# Patient Record
Sex: FEMALE | Race: WHITE | NOT HISPANIC OR LATINO
[De-identification: names, ages, dates, MRNs, and addresses within clinical notes are randomized per-mention and may not be internally consistent; named-entity substitution may affect disease eponyms.]

---

## 2017-02-21 ENCOUNTER — MEDICATION RENEWAL (OUTPATIENT)
Age: 32
End: 2017-02-21

## 2017-02-24 ENCOUNTER — MEDICATION RENEWAL (OUTPATIENT)
Age: 32
End: 2017-02-24

## 2017-03-20 ENCOUNTER — MEDICATION RENEWAL (OUTPATIENT)
Age: 32
End: 2017-03-20

## 2017-04-05 ENCOUNTER — APPOINTMENT (OUTPATIENT)
Dept: NEUROLOGY | Facility: CLINIC | Age: 32
End: 2017-04-05

## 2017-04-05 VITALS
WEIGHT: 149 LBS | BODY MASS INDEX: 23.67 KG/M2 | DIASTOLIC BLOOD PRESSURE: 60 MMHG | HEIGHT: 66.5 IN | SYSTOLIC BLOOD PRESSURE: 100 MMHG | HEART RATE: 72 BPM

## 2017-04-05 DIAGNOSIS — F43.20 ADJUSTMENT DISORDER, UNSPECIFIED: ICD-10-CM

## 2017-07-25 ENCOUNTER — MEDICATION RENEWAL (OUTPATIENT)
Age: 32
End: 2017-07-25

## 2017-07-28 ENCOUNTER — APPOINTMENT (OUTPATIENT)
Dept: NEUROLOGY | Facility: CLINIC | Age: 32
End: 2017-07-28
Payer: MEDICARE

## 2017-07-28 VITALS
HEIGHT: 66.5 IN | SYSTOLIC BLOOD PRESSURE: 110 MMHG | BODY MASS INDEX: 23.67 KG/M2 | WEIGHT: 149 LBS | HEART RATE: 73 BPM | DIASTOLIC BLOOD PRESSURE: 68 MMHG

## 2017-07-28 PROCEDURE — 99214 OFFICE O/P EST MOD 30 MIN: CPT

## 2017-08-16 ENCOUNTER — APPOINTMENT (OUTPATIENT)
Dept: OPHTHALMOLOGY | Facility: CLINIC | Age: 32
End: 2017-08-16
Payer: MEDICARE

## 2017-08-16 DIAGNOSIS — H53.10 UNSPECIFIED SUBJECTIVE VISUAL DISTURBANCES: ICD-10-CM

## 2017-08-16 PROCEDURE — 92083 EXTENDED VISUAL FIELD XM: CPT

## 2017-08-16 PROCEDURE — 99204 OFFICE O/P NEW MOD 45 MIN: CPT

## 2017-08-16 PROCEDURE — 92133 CPTRZD OPH DX IMG PST SGM ON: CPT

## 2017-10-06 ENCOUNTER — APPOINTMENT (OUTPATIENT)
Dept: NEUROLOGY | Facility: CLINIC | Age: 32
End: 2017-10-06
Payer: MEDICARE

## 2017-10-06 VITALS
SYSTOLIC BLOOD PRESSURE: 111 MMHG | HEIGHT: 66.5 IN | HEART RATE: 60 BPM | DIASTOLIC BLOOD PRESSURE: 79 MMHG | BODY MASS INDEX: 23.5 KG/M2 | WEIGHT: 148 LBS

## 2017-10-06 PROCEDURE — 99213 OFFICE O/P EST LOW 20 MIN: CPT

## 2017-12-06 ENCOUNTER — MEDICATION RENEWAL (OUTPATIENT)
Age: 32
End: 2017-12-06

## 2018-04-13 ENCOUNTER — APPOINTMENT (OUTPATIENT)
Dept: NEUROLOGY | Facility: CLINIC | Age: 33
End: 2018-04-13
Payer: COMMERCIAL

## 2018-04-13 VITALS
SYSTOLIC BLOOD PRESSURE: 95 MMHG | WEIGHT: 146 LBS | HEART RATE: 79 BPM | BODY MASS INDEX: 23.19 KG/M2 | DIASTOLIC BLOOD PRESSURE: 55 MMHG | HEIGHT: 66.5 IN

## 2018-04-13 PROCEDURE — 99213 OFFICE O/P EST LOW 20 MIN: CPT

## 2018-09-04 ENCOUNTER — RX RENEWAL (OUTPATIENT)
Age: 33
End: 2018-09-04

## 2018-09-04 ENCOUNTER — MEDICATION RENEWAL (OUTPATIENT)
Age: 33
End: 2018-09-04

## 2018-10-19 ENCOUNTER — APPOINTMENT (OUTPATIENT)
Dept: NEUROLOGY | Facility: CLINIC | Age: 33
End: 2018-10-19
Payer: COMMERCIAL

## 2018-10-19 VITALS
BODY MASS INDEX: 23.67 KG/M2 | HEART RATE: 63 BPM | WEIGHT: 149 LBS | SYSTOLIC BLOOD PRESSURE: 112 MMHG | HEIGHT: 66.5 IN | DIASTOLIC BLOOD PRESSURE: 75 MMHG

## 2018-10-19 PROCEDURE — 99214 OFFICE O/P EST MOD 30 MIN: CPT

## 2018-12-07 ENCOUNTER — MOBILE ON CALL (OUTPATIENT)
Age: 33
End: 2018-12-07

## 2019-03-22 ENCOUNTER — MEDICATION RENEWAL (OUTPATIENT)
Age: 34
End: 2019-03-22

## 2019-03-25 ENCOUNTER — APPOINTMENT (OUTPATIENT)
Dept: NEUROLOGY | Facility: CLINIC | Age: 34
End: 2019-03-25
Payer: COMMERCIAL

## 2019-03-25 VITALS
HEART RATE: 125 BPM | SYSTOLIC BLOOD PRESSURE: 104 MMHG | DIASTOLIC BLOOD PRESSURE: 69 MMHG | BODY MASS INDEX: 25.07 KG/M2 | WEIGHT: 156 LBS | HEIGHT: 66 IN

## 2019-03-25 DIAGNOSIS — R56.9 UNSPECIFIED CONVULSIONS: ICD-10-CM

## 2019-03-25 PROCEDURE — 99214 OFFICE O/P EST MOD 30 MIN: CPT

## 2019-03-25 RX ORDER — TOPIRAMATE 200 MG/1
200 TABLET ORAL
Qty: 180 | Refills: 1 | Status: DISCONTINUED | COMMUNITY
Start: 2018-12-07 | End: 2019-03-25

## 2019-03-25 NOTE — PHYSICAL EXAM
[General Appearance - Alert] : alert [General Appearance - In No Acute Distress] : in no acute distress [General Appearance - Well Nourished] : well nourished [General Appearance - Well Developed] : well developed [General Appearance - Well-Appearing] : healthy appearing [Oriented To Time, Place, And Person] : oriented to person, place, and time [Impaired Insight] : insight and judgment were intact [Affect] : the affect was normal [Person] : oriented to person [Place] : oriented to place [Time] : oriented to time [Short Term Intact] : short term memory intact [Remote Intact] : remote memory intact [Registration Intact] : recent registration memory intact [Concentration Intact] : normal concentrating ability [Visual Intact] : visual attention was ~T not ~L decreased [Naming Objects] : no difficulty naming common objects [Repeating Phrases] : no difficulty repeating a phrase [Writing A Sentence] : no difficulty writing a sentence [Fluency] : fluency intact [Comprehension] : comprehension intact [Reading] : reading intact [Vocabulary] : adequate range of vocabulary [Cranial Nerves Optic (II)] : visual acuity intact bilaterally,  visual fields full to confrontation, pupils equal round and reactive to light [Cranial Nerves Oculomotor (III)] : extraocular motion intact [Cranial Nerves Trigeminal (V)] : facial sensation intact symmetrically [Cranial Nerves Facial (VII)] : face symmetrical [Cranial Nerves Vestibulocochlear (VIII)] : hearing was intact bilaterally [Cranial Nerves Glossopharyngeal (IX)] : tongue and palate midline [Cranial Nerves Accessory (XI - Cranial And Spinal)] : head turning and shoulder shrug symmetric [Cranial Nerves Hypoglossal (XII)] : there was no tongue deviation with protrusion [Motor Strength] : muscle strength was normal in all four extremities [No Muscle Atrophy] : normal bulk in all four extremities [Motor Handedness Left-Handed] : the patient is left hand dominant [Sensation Tactile Decrease] : light touch was intact [Sensation Pain / Temperature Decrease] : pain and temperature was intact [Sensation Vibration Decrease] : vibration was intact [Proprioception] : proprioception was intact [Abnormal Walk] : normal gait [Balance] : balance was intact [2+] : Ankle jerk left 2+ [Sclera] : the sclera and conjunctiva were normal [Outer Ear] : the ears and nose were normal in appearance [Hearing Threshold Finger Rub Not [place]] : hearing was normal [Neck Appearance] : the appearance of the neck was normal [Heart Rate And Rhythm] : heart rate was normal and rhythm regular [Abdomen Soft] : soft [No CVA Tenderness] : no ~M costovertebral angle tenderness [Nail Clubbing] : no clubbing  or cyanosis of the fingernails [Motor Tone] : muscle strength and tone were normal [] : no rash [Paresis Pronator Drift Right-Sided] : no pronator drift on the right [Paresis Pronator Drift Left-Sided] : no pronator drift on the left [Motor Strength Upper Extremities Bilaterally] : strength was normal in both upper extremities [Motor Strength Lower Extremities Bilaterally] : strength was normal in both lower extremities [Past-pointing] : there was no past-pointing [Tremor] : no tremor present [Plantar Reflex Right Only] : normal on the right [Plantar Reflex Left Only] : normal on the left [FreeTextEntry6] : familiar history of left handedness

## 2019-03-25 NOTE — HISTORY OF PRESENT ILLNESS
[Left Handed] : left handed [Stress] : stress [Sleep Deprivation] : sleep deprivation [Postictal Confusion and Lethargy] : postictal confusion and lethargy [Grand Mal Status Epilepticus] : yes [] : yes [Family History of Seizures] : family history of seizures [Divalproex (Depakote)] : divalproex (Depakote) [Lamotrigine (Lamictal)] : lamotrigine (Lamictal) [Oxcarbazepine (Trileptal)] : oxcarbazepine (Trileptal) [Phenytoin (Dilantin)] : phenytoin (Dilantin) [Topiramate (Topamax)] : topiramate (Topamax) [ Complications] : ~T no  complications [Head Trauma] : no head trauma [Febrile Seizures] : no febrile seizures [Meningitis or Encephalitis] : no meningitis or encephalitis [Developmental Delay] : no developmental delay [Stroke] : no stroke  [FreeTextEntry1] : 15 years old [FreeTextEntry2] : 3 types described [FreeTextEntry3] : The first type is where the patient feels as if she is sleepwalking, blacking out, started around age 15 went away there after.  Was started on medications after this time.  The second type is where the patient feels as if things around her are going very fast.  That patient may recall what's going on with these type of events. Events baseline 15x/month. The third type is where it appears as if the patient is stretching, convulsing, foaming of mouth.  At age 19, had been admitted to [facility] then transferred to ICU at [facility] for "convulsions."  Poor recollection of that event.  Had seen Dr Askew in the past.  Was diagnosed with Left TLE in the past. [FreeTextEntry4] : The first event occurred after her father had passed away. [FreeTextEntry6] : 1-5 minutes in duration. [FreeTextEntry8] : emotional stress. [de-identified] : pt unsure, also unsure of last convulsion [de-identified] : reported [de-identified] : Elavil for headache? [de-identified] : Memory issues on TPM.  LTG was possibly harder to tolerate, and had GI sxs on it.

## 2019-03-25 NOTE — REVIEW OF SYSTEMS
[As Noted in HPI] : as noted in HPI [Memory Lapses or Loss] : memory loss [Decr. Concentrating Ability] : decreased concentrating ability [Seizures] : convulsions [Migraine Headache] : migraine headaches [Sleep Disturbances] : sleep disturbances [Negative] : Heme/Lymph [de-identified] : possible paraphrasias intermittently.

## 2019-03-25 NOTE — CONSULT LETTER
[Fred Bhatti MD] : Fred Bhatti MD [Attending, Dept. of Neurology, Division of Epilepsy] : Attending, Dept. of Neurology, Division of Epilepsy [[facility]] : [facility] [ of Neurology] :  of Neurology

## 2019-03-25 NOTE — DISCUSSION/SUMMARY
SUBJECTIVE:   1/9/2019  Chief Complaint:     Subjective      Patient is 71 y.o. female presents with abdominal pain.  C/o abdominal pain in epigastric. Severity 3 out of 10. Fever. Patient's  in the room.      CURRENT MEDICATIONS/OBJECTIVE/VS/PE:     Current Medications:     Current Facility-Administered Medications   Medication Dose Route Frequency Provider Last Rate Last Dose   • azelastine (ASTELIN) nasal spray 2 spray  2 spray Each Nare Daily Levill, Meenakshi G, APRN   2 spray at 01/09/19 1343   • calcium carbonate-vitamin d 600-400 MG-UNIT per tablet 1 tablet  1 tablet Oral Daily Levill, Meenakshi G, APRN   1 tablet at 01/09/19 1342   • carisoprodol (SOMA) tablet 350 mg  350 mg Oral Q6H Kody Hendricks, DO   350 mg at 01/09/19 1736   • diazePAM (VALIUM) tablet 5 mg  5 mg Oral Q12H PRN Kody Hendricks, DO   5 mg at 01/08/19 2350   • docusate sodium (COLACE) capsule 100 mg  100 mg Oral BID Kody Hendricks, DO       • fenofibrate (TRICOR) tablet 145 mg  145 mg Oral Daily Levill, Meenakshi G, APRN   145 mg at 01/09/19 1342   • HYDROcodone-acetaminophen (NORCO)  MG per tablet 1 tablet  1 tablet Oral Q6H PRN Kody Hendricks, DO   1 tablet at 01/09/19 1342   • labetalol (NORMODYNE,TRANDATE) injection 10 mg  10 mg Intravenous Q6H PRN Kody Hendricks, DO       • levoFLOXacin (LEVAQUIN) 750 mg/150 mL D5W (premix) (LEVAQUIN) 750 mg  750 mg Intravenous Q24H Kody Hendricks, DO   750 mg at 01/09/19 1617   • levothyroxine (SYNTHROID, LEVOTHROID) tablet 50 mcg  50 mcg Oral Daily Levill, Meenakshi G, APRN   50 mcg at 01/09/19 1342   • Magnesium Oxide tablet 400 mg  400 mg Oral Daily Levill, Meenakshi G, APRN   400 mg at 01/09/19 1341   • metoprolol succinate XL (TOPROL-XL) 24 hr tablet 25 mg  25 mg Oral Daily Levill, Meenakshi G, APRN   25 mg at 01/09/19 1341   • metroNIDAZOLE (FLAGYL) IVPB 500 mg  500 mg Intravenous Q8H Kody Hendricks DO   500 mg at 01/09/19 1339   • ondansetron (ZOFRAN) injection 4 mg  4 mg  Intravenous Q6H PRN Levill, Meenakshi G, APRN   4 mg at 01/09/19 0640   • polyethylene glycol 3350 powder (packet)  17 g Oral Daily Eladio, Kody JOSE DE JESUS    17 g at 01/09/19 1631   • prochlorperazine (COMPAZINE) tablet 5 mg  5 mg Oral Q6H PRN Levill, Meenakshi G, APRN       • promethazine (PHENERGAN) tablet 25 mg  25 mg Oral Q6H PRN Levill, Meenakshi G, APRN       • sodium chloride 0.9 % flush 10 mL  10 mL Intravenous PRN Ted Leach MD       • sodium chloride 0.9 % flush 3 mL  3 mL Intravenous Q12H Levill, Meenakshi G, APRN   3 mL at 01/08/19 2130   • sodium chloride 0.9 % flush 3-10 mL  3-10 mL Intravenous PRN Levill, Meenakshi G, APRN       • Sodium chloride 0.9 % infusion  125 mL/hr Intravenous Continuous Ted Leach  mL/hr at 01/09/19 0914 125 mL/hr at 01/09/19 0914   • triamcinolone (KENALOG) 0.1 % cream   Topical Daily Levill, Meenakshi G, APRN       • venlafaxine XR (EFFEXOR-XR) 24 hr capsule 75 mg  75 mg Oral Daily With Breakfast Levill, Meenakshi G, APRN   75 mg at 01/09/19 1341       Objective     Physical Exam:   Temp:  [98.7 °F (37.1 °C)-101.9 °F (38.8 °C)] 98.7 °F (37.1 °C)  Heart Rate:  [] 76  Resp:  [18-20] 20  BP: (140-184)/(61-89) 152/65    Physical Exam:  General Appearance:    Alert, cooperative, in no acute distress   Head:    Normocephalic, without obvious abnormality, atraumatic   Eyes:            Lids and lashes normal, conjunctivae and sclerae normal, no icterus, no pallor, corneas clear   Ears:    External ears appear intact with no abnormalities noted   Throat:   No oral lesions, no thrush, oral mucosa moist   Neck:   No adenopathy, supple, trachea midline, no thyromegaly   Back:     No kyphosis present, no scoliosis present, no tenderness to percussion or palpation.   Lungs:     Clear to auscultation,respirations regular, even and                   unlabored    Heart:    Regular rhythm and normal rate, normal S1 and S2   Breast Exam:    Deferred   Abdomen:     Normal bowel sounds, soft, no  masses palpable, no organomegaly, tenderness in epigastric, non-distended, no guarding, no rebound tenderness.   Genitalia:    Deferred   Extremities:   Moves all extremities well, no edema, no cyanosis.   Pulses:   deferred   Skin:   No bleeding.   Lymph nodes:   No palpable cervical adenopathy   Neurologic:  Awake, alert, cooperative.            Results Review:     Laboratory Data:   Results from last 7 days   Lab Units  01/09/19   0633  01/08/19   0935   GLUCOSE mg/dL  112*  118*   BUN mg/dL  11  12   CREATININE mg/dL  0.72  0.69   SODIUM mmol/L  137  137   POTASSIUM mmol/L  3.9  3.5   CHLORIDE mmol/L  100  97   CO2 mmol/L  29.0  30.0   CALCIUM mg/dL  8.5  9.2   TOTAL PROTEIN g/dL  6.6  6.8   ALBUMIN g/dL  4.00  4.30   ALT (SGPT) U/L  272*  101*   AST (SGOT) U/L  343*  294*   ALK PHOS U/L  111  82   BILIRUBIN mg/dL  0.8  0.5   EGFR IF NONAFRICN AM mL/min/1.73  80  84   GLOBULIN gm/dL   --   2.5   A/G RATIO g/dL   --   1.7   BUN / CREAT RATIO   15.3  17.4   ANION GAP mmol/L  8.0  10.0   BILIRUBIN DIRECT mg/dL  0.0   --    INDIRECT BILIRUBIN mg/dL  0.6   --      Results from last 7 days   Lab Units  01/09/19   0633  01/08/19   0935   WBC 10*3/mm3  21.24*  12.66*   RBC 10*6/mm3  3.93  4.02   HEMOGLOBIN g/dL  12.8  13.1   HEMATOCRIT %  38.4  39.4   MCV fL  97.7  98.0   MCH pg  32.6  32.6   MCHC g/dL  33.3  33.2   RDW %  11.9  12.0   RDW-SD fl  42.9  42.7   MPV fL  9.6  9.7   PLATELETS 10*3/mm3  323  383             Results from last 7 days   Lab Units  01/08/19   0935   LIPASE U/L  133           Imaging Results (last 72 hours)     Procedure Component Value Units Date/Time    MRI abdomen wo contrast mrcp [[id number]] Collected:  01/08/19 1359     Updated:  01/09/19 0710    Narrative:         EXAMINATION:  MRCP/MRI OF THE ABDOMEN WITHOUT AND WITH CONTRAST    CLINICAL INFORMATION: Epigastric pain, choledocholithiasis    DESCRIPTION:  Technique: Multiplanar imaging was performed using multiple pulse  sequences. MRCP was  also performed.  Computer generated 3-D reconstructions/MIPS were performed.  Contrast: None  Comparison: CT abdomen and pelvis 1/8/2019    FINDINGS:  Liver: No hepatic mass is visualized.  Gallbladder and bile ducts: There is severe dilatation of the  common bile duct measuring 2.4 cm. Multiple stones are seen in  the common bile duct. There is also mild to moderate intrahepatic  biliary dilatation. There is pneumobilia. The gallbladder has  been resected.  Pancreas: Pancreatic duct is dilated, measuring up to 0.8 cm in  diameter.  Spleen: The spleen is normal.      Kidneys: There is no evidence of hydronephrosis. There is a cyst  in the left kidney.      Adrenal glands: There is no adrenal nodule.      Peritoneum, retroperitoneum and gastrointestinal tract: There is  no ascites.      Lymph nodes: No suspicious lymph node enlargement is seen.  Aorta and main branches: The aorta and main branches are normal  in caliber.          Impression:       CONCLUSION:  Choledocholithiasis.  Severely dilated common bile duct and moderate dilatation of the  intrahepatic biliary ducts.  Dilatation of the pancreatic duct.  The above findings could be due to a pancreatic head or ampullary  mass versus an obstructing stone.     Electronically signed by:  Oli Mayers MD  1/9/2019 7:08 AM CST  Workstation: [id number]    CT Abdomen Pelvis With Contrast [[id number]] Collected:  01/08/19 1138     Updated:  01/08/19 1411    Narrative:         PROCEDURE: Ct abdomen and pelvis with contrast    REASON FOR EXAM: ventral hernia pain    FINDINGS: Comparison study dated  March 20, 2018. Axial computer  tomography sequential imaging was performed from the diaphragms  through the symphysis pubis with IV contrast administration.  Sagittal and coronal reformates was performed. This exam was  performed according to our departmental dose optimization  program, which includes automated exposure control, adjustment of  the mA and/or KV according to  patient size and/or use of  iterative reconstruction technique.     Imaging through lung bases reveals no abnormality.    The liver is normal. The gallbladder surgically absent.  Intrahepatic and extrahepatic biliary dilatation with pneumobilia  as well as contrast. Proximal common bile duct small 5.2 mm  choledocholithiasis. The right common bile duct measures 1.8 cm  in greatest diameter. There is focal proximal right common bile  duct caliber change. Soft tissue prominence in the region of the  head of the pancreas in the common bile duct region with  associated dilated intra-  Hepatic biliary system as well as pancreatic duct. The pancreas  is otherwise unremarkable. The spleen is normal. Bilateral  adrenal glands are normal. Right kidney and ureter are normal.  Left kidney upper pole small simple renal cortical cyst which  measures 1.3 cm in greatest diameter. Left kidney and ureter are  otherwise unremarkable. The bladder is normal. The uterus is  surgically absent. Nonspecific soft tissue prominence in region  of the head of the pancreas and ampulla of the duodenum.  Otherwise hollow viscera is unremarkable. No lymphadenopathy in  the abdomen or the pelvis. No acute osseous abnormality. Stable  postsurgical changes with bilateral decompressive laminectomies  at the L3 and L4 vertebral body level with associated discectomy  at the L3-L4 and L4-L5 disc space level with small intervertebral  fusion device in place at the L3-4 disc space level and Ray cage  device in place at the L4-L5 disc space level. Bilateral  posterior tramaine fusion of the L3 through the L5 vertebral body with  satisfactory anatomical alignment.      Impression:       1. Diffusely dilated intra and extrahepatic biliary system with  contrast as well as pneumobilia and focal caliber change in the  region of the proximal common bile duct. Diffusely dilated  pancreatic duct. Proximal common bile duct 5.2 mm  choledocholithiasis. Soft tissue  "prominence in the region of the  duodenal ampulla and head of the pancreas with associated focal  caliber change of the common bile duct would be suspicious for  postsurgical changes versus duodenal ampulla or pancreatic head  neoplasm. Recommend GI consultation.  2. Stable extensive lumbar spine postsurgical changes as  described above..  3. Remainder CT abdomen pelvis study with contrast unremarkable..    Electronically signed by:  Teodoro Roberts MD  1/8/2019 12:37 PM CST  Workstation: [id number]           I reviewed the patient's new clinical results.  I reviewed the patient's new imaging results and agree with the interpretation.     ASSESSMENT/PLAN:   ASSESSMENT:     Active Problems:    Pneumobilia      Abdominal pain in epigastric.     Elevated liver enzymes. Transaminases. (Alk phos and bili are normal.)    Abnormal CT scan abdomen/pelvis. \"Diffusely dilated intra and extrahepatic biliary system with contrast as well as pneumobilia and focal caliber change in the  region of the proximal common bile duct. Diffusely dilated pancreatic duct. Proximal common bile duct 5.2 mm choledocholithiasis. Soft tissue prominence in the region of the duodenal ampulla and head of the pancreas with associated focal caliber change of the common bile duct would be suspicious for postsurgical changes versus duodenal ampulla or pancreatic head neoplasm.    Abnormal MRI/MRCP \"Choledocholithiasis. Severely dilated common bile duct and moderate dilatation of the intrahepatic biliary ducts. Dilatation of the pancreatic duct.    Leucocytosis and fever. Blood culture have been obtained and antibiotic started by hospitalist.    I discussed the patient with Dr. Allen, he saw the patient in the past and did the prior ERCP and EGD. The reports of prior CT scan and EGD of 8/16 2016 were reviewed, it revealed that the patient had choledocholithiasis, which represent the bile duct stones that could not be removed at that time, dilated CBD and " pancreas duct and had underwent choledochoduodenostomy. those finding are similar to findings on most current CT and MRI/MRCP.    PLAN:   ERCP tomorrow by Dr. Allen.  Will have radiology compare the latest imaging to prior imaging and add addendum to official report.  Blood culture have been obtained and antibiotic started by hospitalist.      I discussed the assessment and recommendations with the patient and her . They verbalized understanding. All questions were answered. The patient denies any further question.  Discussed with the patient's nurse.       Aderemi Jaiyeola, MD  01/09/19  5:49 PM    EMR Dragon/Transcription disclaimer:   Some of this note may be an electronic transcription/translation of spoken language to printed text. The electronic translation of spoken language may permit erroneous, or at times, nonsensical words or phrases to be inadvertently transcribed; Although I have reviewed the note for such errors, some may still exist.        -         [Medically Refractory (seizure within the last year)] : Medically Refractory (seizure within the last year) [Simple Partial] : simple partial [Complex Partial] : complex partial [Secondary Generalization] : secondary generalization [Idiopathic] : idiopathic  [Focal] : focal [Risks Associated with Driving/[place] Law] : As per my usual protocol, the patient was advised in regards to risks and driving privileges associated with the [place] Guidelines.  [Safety Recommendations] : The patient was advised in regards to the risk of seizures and general seizure safety recommendations including not to be bathing alone, climbing to high places and operating heavy machinery. [Compliance with Medications] : The importance of compliance with medications was reinforced. [Teratogenicity] : Risks associated with AED use in pregnancy, teratogenicity and methods of contraception were discussed.  [Sleep Hygiene/Sleep Disruption Risks] : Sleep hygiene and the risks of sleep disruption were discussed. 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  Onset of events in the setting of emotional distress, this may raise the specter of the possibility of PNES. Prior video EEGs have also been unrevealing including with the use of sphenoidal electrodes.  Prior history of ICU admission for coma associated with amnesia for the hospitalization may have been medication related, and is of unclear specificity/etiology. \par Migraines, frequent.  Migraine with aura is considered as a cause for some of her symptoms.\par Past social h/o Traumatic loss of father, yrs ago with exacerbation of events during difficult breakup, .  \par \par -Mild obesity, with substantial improvement on TPM.  Monitor for nephrolithiasis, metabolic acidosis\par -On TPM 200mg bid for possible seizure/migraines. Wt loss. baseline refractory chronic headaches occurring up to 10-15 times per month now 2/wk or less.  Advised ibuprofen abortive treatment less than 3 days per week. Staying well hydrated. Wt loss, monitoring.  \par -consider inc the LTG to 100mg bid (eventual decrease in TPM if necessary), if nephrolithiasis (+fm hx) worsens/recurs, or if urologist recommends change of meds.\par -if events continue/recur consider repeat VEEG with abrupt med w/d (stop LTG/TPM on admission) for event capture.\par -triptan PO prn migraine\par -on OCP. FA\par -folic acid, barrier protection. consider reduction of meds, ie TPM if planning in future.\par -hydration encouraged\par -s/p referral to psychotherapy referral for coping strategies

## 2019-03-25 NOTE — DATA REVIEWED
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  No MTS. \par MRI brain 2/2015: left temporal ventricular predominance, choroid fissure cyst.\par  [de-identified] : VEEG: No events captured, no interictal abn, including with sphenoidals in 2007.  \par VEEG 1/2015: No events captured, no interictal abn. [de-identified] : prior CT w/ concern for nephrolithiasis. not seen on ultrasound. [FreeTextEntry1] : Labs: 10/2018 wbc 11, HCO3 19, lft nl, B12 248, LTG 3.1, TPM 13.5

## 2019-08-13 ENCOUNTER — APPOINTMENT (OUTPATIENT)
Dept: NEUROLOGY | Facility: CLINIC | Age: 34
End: 2019-08-13

## 2019-08-13 ENCOUNTER — APPOINTMENT (OUTPATIENT)
Dept: NEUROLOGY | Facility: CLINIC | Age: 34
End: 2019-08-13
Payer: COMMERCIAL

## 2019-08-13 VITALS
WEIGHT: 159 LBS | HEART RATE: 70 BPM | HEIGHT: 66 IN | DIASTOLIC BLOOD PRESSURE: 65 MMHG | BODY MASS INDEX: 25.55 KG/M2 | SYSTOLIC BLOOD PRESSURE: 95 MMHG

## 2019-08-13 DIAGNOSIS — Z86.69 PERSONAL HISTORY OF OTHER DISEASES OF THE NERVOUS SYSTEM AND SENSE ORGANS: ICD-10-CM

## 2019-08-13 DIAGNOSIS — H93.12 TINNITUS, LEFT EAR: ICD-10-CM

## 2019-08-13 PROCEDURE — 99215 OFFICE O/P EST HI 40 MIN: CPT

## 2019-08-13 NOTE — HISTORY OF PRESENT ILLNESS
[Left Handed] : left handed [Sleep Deprivation] : sleep deprivation [Stress] : stress [Grand Mal Status Epilepticus] : yes [Postictal Confusion and Lethargy] : postictal confusion and lethargy [] : yes [Family History of Seizures] : family history of seizures [Lamotrigine (Lamictal)] : lamotrigine (Lamictal) [Divalproex (Depakote)] : divalproex (Depakote) [Oxcarbazepine (Trileptal)] : oxcarbazepine (Trileptal) [Phenytoin (Dilantin)] : phenytoin (Dilantin) [Topiramate (Topamax)] : topiramate (Topamax) [ Complications] : ~T no  complications [Head Trauma] : no head trauma [Febrile Seizures] : no febrile seizures [Meningitis or Encephalitis] : no meningitis or encephalitis [Stroke] : no stroke  [Developmental Delay] : no developmental delay [FreeTextEntry1] : 15 years old [FreeTextEntry2] : 3 types described [FreeTextEntry3] : The first type is where the patient feels as if she is sleepwalking, blacking out, started around age 15 went away there after.  Was started on medications after this time.  The second type is where the patient feels as if things around her are going very fast.  That patient may recall what's going on with these type of events. Events baseline 15x/month. The third type is where it appears as if the patient is stretching, convulsing, foaming of mouth.  At age 19, had been admitted to [facility] then transferred to ICU at [facility] for "convulsions."  Poor recollection of that event.  Had seen Dr Askew in the past.  Was diagnosed with Left TLE in the past. [FreeTextEntry4] : The first event occurred after her father had passed away. [FreeTextEntry8] : emotional stress. [FreeTextEntry6] : 1-5 minutes in duration. [de-identified] : pt unsure, also unsure of last convulsion [de-identified] : reported [de-identified] : Memory issues on TPM.  LTG was possibly harder to tolerate, and had GI sxs on it. [de-identified] : Elavil for headache?

## 2019-08-13 NOTE — PHYSICAL EXAM
[General Appearance - Alert] : alert [General Appearance - In No Acute Distress] : in no acute distress [General Appearance - Well Nourished] : well nourished [General Appearance - Well Developed] : well developed [General Appearance - Well-Appearing] : healthy appearing [Oriented To Time, Place, And Person] : oriented to person, place, and time [Impaired Insight] : insight and judgment were intact [Affect] : the affect was normal [Person] : oriented to person [Place] : oriented to place [Time] : oriented to time [Short Term Intact] : short term memory intact [Remote Intact] : remote memory intact [Registration Intact] : recent registration memory intact [Visual Intact] : visual attention was ~T not ~L decreased [Concentration Intact] : normal concentrating ability [Naming Objects] : no difficulty naming common objects [Repeating Phrases] : no difficulty repeating a phrase [Writing A Sentence] : no difficulty writing a sentence [Fluency] : fluency intact [Reading] : reading intact [Comprehension] : comprehension intact [Cranial Nerves Optic (II)] : visual acuity intact bilaterally,  visual fields full to confrontation, pupils equal round and reactive to light [Vocabulary] : adequate range of vocabulary [Cranial Nerves Oculomotor (III)] : extraocular motion intact [Cranial Nerves Trigeminal (V)] : facial sensation intact symmetrically [Cranial Nerves Facial (VII)] : face symmetrical [Cranial Nerves Vestibulocochlear (VIII)] : hearing was intact bilaterally [Cranial Nerves Glossopharyngeal (IX)] : tongue and palate midline [Cranial Nerves Accessory (XI - Cranial And Spinal)] : head turning and shoulder shrug symmetric [Cranial Nerves Hypoglossal (XII)] : there was no tongue deviation with protrusion [Motor Strength] : muscle strength was normal in all four extremities [Motor Handedness Left-Handed] : the patient is left hand dominant [No Muscle Atrophy] : normal bulk in all four extremities [Sensation Tactile Decrease] : light touch was intact [Sensation Pain / Temperature Decrease] : pain and temperature was intact [Sensation Vibration Decrease] : vibration was intact [Proprioception] : proprioception was intact [Abnormal Walk] : normal gait [Balance] : balance was intact [2+] : Ankle jerk left 2+ [Outer Ear] : the ears and nose were normal in appearance [Sclera] : the sclera and conjunctiva were normal [Hearing Threshold Finger Rub Not [place]] : hearing was normal [Neck Appearance] : the appearance of the neck was normal [Abdomen Soft] : soft [Heart Rate And Rhythm] : heart rate was normal and rhythm regular [No CVA Tenderness] : no ~M costovertebral angle tenderness [Nail Clubbing] : no clubbing  or cyanosis of the fingernails [] : no rash [Motor Tone] : muscle strength and tone were normal [Paresis Pronator Drift Right-Sided] : no pronator drift on the right [Paresis Pronator Drift Left-Sided] : no pronator drift on the left [Motor Strength Lower Extremities Bilaterally] : strength was normal in both lower extremities [Motor Strength Upper Extremities Bilaterally] : strength was normal in both upper extremities [Tremor] : no tremor present [Past-pointing] : there was no past-pointing [Plantar Reflex Right Only] : normal on the right [Plantar Reflex Left Only] : normal on the left [FreeTextEntry6] : familiar history of left handedness

## 2019-08-13 NOTE — DATA REVIEWED
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  No MTS. \par MRI brain 2/2015: left temporal ventricular predominance, choroid fissure cyst.\par  [de-identified] : Reviewed EEG data from 5/2001 Dr Childers / 9/2002 Dr Almaraz: bisynchronous bursts of slowing frontally predominant with HV. \par On the pages of EEG provided, some slowing noted, but it appears nonspecific to me. \par VEEG: No events captured, no interictal abn, including with sphenoidals in 2007.  \par VEEG 1/2015: No events captured, no interictal abn. [de-identified] : prior CT w/ concern for nephrolithiasis. not seen on ultrasound. [FreeTextEntry1] : Labs: 10/2018 wbc 11, HCO3 19, lft nl, B12 248, LTG 3.1, TPM 13.5

## 2019-08-13 NOTE — REVIEW OF SYSTEMS
[As Noted in HPI] : as noted in HPI [Memory Lapses or Loss] : memory loss [Decr. Concentrating Ability] : decreased concentrating ability [Seizures] : convulsions [Sleep Disturbances] : sleep disturbances [Migraine Headache] : migraine headaches [Negative] : Heme/Lymph [de-identified] : possible paraphrasias intermittently.

## 2019-08-13 NOTE — DISCUSSION/SUMMARY
[Simple Partial] : simple partial [Medically Refractory (seizure within the last year)] : Medically Refractory (seizure within the last year) [Complex Partial] : complex partial [Secondary Generalization] : secondary generalization [Idiopathic] : idiopathic  [Focal] : focal [Risks Associated with Driving/[place] Law] : As per my usual protocol, the patient was advised in regards to risks and driving privileges associated with the [place] Guidelines.  [Safety Recommendations] : The patient was advised in regards to the risk of seizures and general seizure safety recommendations including not to be bathing alone, climbing to high places and operating heavy machinery. [Compliance with Medications] : The importance of compliance with medications was reinforced. [Teratogenicity] : Risks associated with AED use in pregnancy, teratogenicity and methods of contraception were discussed.  [Sleep Hygiene/Sleep Disruption Risks] : Sleep hygiene and the risks of sleep disruption were discussed. 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 unclear how diagnosis was made (clinically), possibly based on cystic appearance of left temporal lobe, however per my review this cyst may be intraventricular and benign in appearance on MRI.  Onset of events in the setting of emotional distress, this may raise the specter of the possibility of PNES. Prior video EEGs have also been unrevealing including with the use of sphenoidal electrodes.  Reports remote h/o of single GTC, assoc history of ICU admission associated with amnesia for the hospitalization may have been medication related, and is of unclear specificity/etiology. \par Migraines, frequent.  Migraine with aura is considered as a cause for some of her symptoms.\par H/o sleep walking at age 15.\par Past social h/o Traumatic loss of father, yrs ago with exacerbation of events during difficult breakup, .  \par Baseline refractory chronic headaches occurring up to 10-15 times per month now 2/wk or less. \par \par -Mild obesity, with substantial improvement on TPM.  Recent confirmed diagnosis of nephrolithiasis, with renal complications.  Seeing urologist\par -Taper TPM using for seizure/migraines/wt loss. \par -ibuprofen abortive treatment less than 3 days per week. Staying well hydrated. Wt loss, monitoring.  \par - inc the LTG to 150mg bid (decrease in TPM) due to nephrolithiasis\par -if events continue/recur consider repeat VEEG with abrupt med w/d (stop LTG/TPM on admission) for event capture.\par -triptan PO prn migraine \par -off OCP. FA\par -folic acid, barrier protection. consider reduction of meds\par -hydration encouraged\par -s/p referral to psychotherapy referral for coping strategies in past\par -Jaw clenching, and left ear tinnitus reported.  DMD /ENT eval recommended.\par \par

## 2019-08-13 NOTE — CONSULT LETTER
[Fred Bhatti MD] : Fred Bhatti MD [Attending, Dept. of Neurology, Division of Epilepsy] : Attending, Dept. of Neurology, Division of Epilepsy [[facility]] : [facility] [ of Neurology] :  of Neurology

## 2019-08-16 ENCOUNTER — MOBILE ON CALL (OUTPATIENT)
Age: 34
End: 2019-08-16

## 2019-08-20 ENCOUNTER — OTHER (OUTPATIENT)
Age: 34
End: 2019-08-20

## 2019-08-29 ENCOUNTER — MOBILE ON CALL (OUTPATIENT)
Age: 34
End: 2019-08-29

## 2019-11-13 ENCOUNTER — APPOINTMENT (OUTPATIENT)
Dept: NEUROLOGY | Facility: CLINIC | Age: 34
End: 2019-11-13
Payer: COMMERCIAL

## 2019-11-13 VITALS
HEIGHT: 66 IN | DIASTOLIC BLOOD PRESSURE: 80 MMHG | SYSTOLIC BLOOD PRESSURE: 117 MMHG | BODY MASS INDEX: 24.59 KG/M2 | HEART RATE: 70 BPM | WEIGHT: 153 LBS

## 2019-11-13 PROCEDURE — 99214 OFFICE O/P EST MOD 30 MIN: CPT

## 2019-11-13 NOTE — CONSULT LETTER
[Fred Bhatti MD] : Fred Bhatti MD [[facility]] : [facility] [Attending, Dept. of Neurology, Division of Epilepsy] : Attending, Dept. of Neurology, Division of Epilepsy [ of Neurology] :  of Neurology

## 2019-11-19 NOTE — DATA REVIEWED
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  No MTS. \par MRI brain 2/2015: left temporal ventricular predominance, choroid fissure cyst.\par  [de-identified] : Reviewed EEG data from 5/2001 Dr Childers / 9/2002 Dr Almaraz: bisynchronous bursts of slowing frontally predominant with HV. \par On the pages of EEG provided, some slowing noted, but it appears nonspecific to me. \par VEEG: No events captured, no interictal abn, including with sphenoidals in 2007.  \par VEEG 1/2015: No events captured, no interictal abn. [FreeTextEntry1] : Labs: 10/2018 wbc 11, HCO3 19, lft nl, B12 248, LTG 3.1, TPM 13.5 [de-identified] : prior CT w/ concern for nephrolithiasis. not seen on ultrasound.

## 2019-11-19 NOTE — DISCUSSION/SUMMARY
[Medically Refractory (seizure within the last year)] : Medically Refractory (seizure within the last year) [Complex Partial] : complex partial [Simple Partial] : simple partial [Idiopathic] : idiopathic  [Secondary Generalization] : secondary generalization [Focal] : focal [Safety Recommendations] : The patient was advised in regards to the risk of seizures and general seizure safety recommendations including not to be bathing alone, climbing to high places and operating heavy machinery. [Compliance with Medications] : The importance of compliance with medications was reinforced. [Risks Associated with Driving/[place] Law] : As per my usual protocol, the patient was advised in regards to risks and driving privileges associated with the [place] Guidelines.  [Sleep Hygiene/Sleep Disruption Risks] : Sleep hygiene and the risks of sleep disruption were discussed. [Teratogenicity] : Risks associated with AED use in pregnancy, teratogenicity and methods of contraception were discussed.  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 unclear how diagnosis was made (clinically), possibly based on cystic appearance of left temporal lobe, however per my review this cyst may be intraventricular and benign in appearance on MRI.  Onset of events in the setting of emotional distress, this may raise the specter of the possibility of PNES. Prior video EEGs have also been unrevealing including with the use of sphenoidal electrodes.  Reports remote h/o of single GTC, assoc history of ICU admission associated with amnesia for the hospitalization may have been medication related, and is of unclear specificity/etiology. \par Migraines, frequent.  Migraine with aura is considered as a cause for some of her symptoms.\par H/o sleep walking at age 15.\par Past social h/o Traumatic loss of father, yrs ago with exacerbation of events during difficult breakup, .  \par Baseline refractory chronic headaches occurring up to 10-15 times per month now 2/wk or less. \par \par \par Plan:\par *** Medication schedule given to her again  (patient instructed to follow up by phone in  in 2-3 weeks to   see if she is following correctly)\par - reviewed seizure triggers\par -triptan PO prn migraine \par - encouraged fluids\par  follow up in 3 months\par \par \par

## 2019-11-19 NOTE — PHYSICAL EXAM
[General Appearance - Alert] : alert [General Appearance - Well Nourished] : well nourished [General Appearance - Well Developed] : well developed [General Appearance - In No Acute Distress] : in no acute distress [General Appearance - Well-Appearing] : healthy appearing [Affect] : the affect was normal [Oriented To Time, Place, And Person] : oriented to person, place, and time [Impaired Insight] : insight and judgment were intact [Person] : oriented to person [Place] : oriented to place [Short Term Intact] : short term memory intact [Remote Intact] : remote memory intact [Time] : oriented to time [Registration Intact] : recent registration memory intact [Concentration Intact] : normal concentrating ability [Visual Intact] : visual attention was ~T not ~L decreased [Naming Objects] : no difficulty naming common objects [Repeating Phrases] : no difficulty repeating a phrase [Writing A Sentence] : no difficulty writing a sentence [Fluency] : fluency intact [Reading] : reading intact [Comprehension] : comprehension intact [Vocabulary] : adequate range of vocabulary [Cranial Nerves Oculomotor (III)] : extraocular motion intact [Cranial Nerves Optic (II)] : visual acuity intact bilaterally,  visual fields full to confrontation, pupils equal round and reactive to light [Cranial Nerves Trigeminal (V)] : facial sensation intact symmetrically [Cranial Nerves Facial (VII)] : face symmetrical [Cranial Nerves Glossopharyngeal (IX)] : tongue and palate midline [Cranial Nerves Vestibulocochlear (VIII)] : hearing was intact bilaterally [Cranial Nerves Accessory (XI - Cranial And Spinal)] : head turning and shoulder shrug symmetric [Motor Strength] : muscle strength was normal in all four extremities [Cranial Nerves Hypoglossal (XII)] : there was no tongue deviation with protrusion [Motor Handedness Left-Handed] : the patient is left hand dominant [No Muscle Atrophy] : normal bulk in all four extremities [Sensation Tactile Decrease] : light touch was intact [Sensation Pain / Temperature Decrease] : pain and temperature was intact [Sensation Vibration Decrease] : vibration was intact [Proprioception] : proprioception was intact [Abnormal Walk] : normal gait [Balance] : balance was intact [2+] : Ankle jerk left 2+ [Sclera] : the sclera and conjunctiva were normal [Outer Ear] : the ears and nose were normal in appearance [Hearing Threshold Finger Rub Not [place]] : hearing was normal [Neck Appearance] : the appearance of the neck was normal [Heart Rate And Rhythm] : heart rate was normal and rhythm regular [Abdomen Soft] : soft [No CVA Tenderness] : no ~M costovertebral angle tenderness [Nail Clubbing] : no clubbing  or cyanosis of the fingernails [Motor Tone] : muscle strength and tone were normal [] : no rash [Paresis Pronator Drift Right-Sided] : no pronator drift on the right [Motor Strength Upper Extremities Bilaterally] : strength was normal in both upper extremities [Paresis Pronator Drift Left-Sided] : no pronator drift on the left [Past-pointing] : there was no past-pointing [Motor Strength Lower Extremities Bilaterally] : strength was normal in both lower extremities [Tremor] : no tremor present [Plantar Reflex Right Only] : normal on the right [Plantar Reflex Left Only] : normal on the left [FreeTextEntry6] : familiar history of left handedness

## 2019-11-19 NOTE — HISTORY OF PRESENT ILLNESS
[Left Handed] : left handed [Stress] : stress [Grand Mal Status Epilepticus] : yes [Sleep Deprivation] : sleep deprivation [Postictal Confusion and Lethargy] : postictal confusion and lethargy [Family History of Seizures] : family history of seizures [] : yes [Lamotrigine (Lamictal)] : lamotrigine (Lamictal) [Divalproex (Depakote)] : divalproex (Depakote) [Phenytoin (Dilantin)] : phenytoin (Dilantin) [Oxcarbazepine (Trileptal)] : oxcarbazepine (Trileptal) [Topiramate (Topamax)] : topiramate (Topamax) [ Complications] : ~T no  complications [Febrile Seizures] : no febrile seizures [Head Trauma] : no head trauma [Developmental Delay] : no developmental delay [Meningitis or Encephalitis] : no meningitis or encephalitis [FreeTextEntry1] : 15 years old [Stroke] : no stroke  [FreeTextEntry2] : 3 types described [FreeTextEntry3] : The first type is where the patient feels as if she is sleepwalking, blacking out, started around age 15 went away there after.  Was started on medications after this time.  The second type is where the patient feels as if things around her are going very fast.  That patient may recall what's going on with these type of events. Events baseline 15x/month. The third type is where it appears as if the patient is stretching, convulsing, foaming of mouth.  At age 19, had been admitted to [facility] then transferred to ICU at [facility] for "convulsions."  Poor recollection of that event.  Had seen Dr Askew in the past.  Was diagnosed with Left TLE in the past. [FreeTextEntry6] : 1-5 minutes in duration. [FreeTextEntry4] : The first event occurred after her father had passed away. [FreeTextEntry8] : emotional stress. [de-identified] : reported [de-identified] : pt unsure, also unsure of last convulsion [de-identified] : Elavil for headache? [de-identified] : Memory issues on TPM.  LTG was possibly harder to tolerate, and had GI sxs on it.

## 2019-11-19 NOTE — REVIEW OF SYSTEMS
[Memory Lapses or Loss] : memory loss [As Noted in HPI] : as noted in HPI [Decr. Concentrating Ability] : decreased concentrating ability [Seizures] : convulsions [Sleep Disturbances] : sleep disturbances [Migraine Headache] : migraine headaches [Negative] : Heme/Lymph [de-identified] : possible paraphrasias intermittently.

## 2019-12-16 ENCOUNTER — MEDICATION RENEWAL (OUTPATIENT)
Age: 34
End: 2019-12-16

## 2020-01-13 RX ORDER — SUMATRIPTAN 50 MG/1
50 TABLET, FILM COATED ORAL
Qty: 6 | Refills: 3 | Status: DISCONTINUED | COMMUNITY
Start: 2019-03-25 | End: 2020-01-13

## 2020-02-25 ENCOUNTER — APPOINTMENT (OUTPATIENT)
Dept: NEUROLOGY | Facility: CLINIC | Age: 35
End: 2020-02-25

## 2020-03-06 ENCOUNTER — APPOINTMENT (OUTPATIENT)
Dept: NEUROLOGY | Facility: CLINIC | Age: 35
End: 2020-03-06
Payer: COMMERCIAL

## 2020-03-06 VITALS
HEIGHT: 66 IN | WEIGHT: 150 LBS | DIASTOLIC BLOOD PRESSURE: 80 MMHG | SYSTOLIC BLOOD PRESSURE: 120 MMHG | HEART RATE: 73 BPM | BODY MASS INDEX: 24.11 KG/M2

## 2020-03-06 PROCEDURE — 99214 OFFICE O/P EST MOD 30 MIN: CPT

## 2020-03-06 NOTE — DATA REVIEWED
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  No MTS. \par MRI brain 2/2015: left temporal ventricular predominance, choroid fissure cyst.\par  [de-identified] : Reviewed EEG data from 5/2001 Dr Childers / 9/2002 Dr Almaraz: bisynchronous bursts of slowing frontally predominant with HV. \par On the pages of EEG provided, some slowing noted, but it appears nonspecific to me. \par VEEG: No events captured, no interictal abn, including with sphenoidals in 2007.  \par VEEG 1/2015: No events captured, no interictal abn. [de-identified] : prior CT w/ concern for nephrolithiasis. not seen on ultrasound. [FreeTextEntry1] : Labs: 10/2018 wbc 11, HCO3 19, lft nl, B12 248, LTG 3.1, TPM 13.5

## 2020-03-06 NOTE — DISCUSSION/SUMMARY
[Medically Refractory (seizure within the last year)] : Medically Refractory (seizure within the last year) [Simple Partial] : simple partial [Complex Partial] : complex partial [Secondary Generalization] : secondary generalization [Idiopathic] : idiopathic  [Focal] : focal [[place]isks Associated with D[place] Law] : As per my usual protocol, the patient was advised in regards to risks and driving privileges associated with the [place] Guidelines.  [Safety Recommendations] : The patient was advised in regards to the risk of seizures and general seizure safety recommendations including not to be bathing alone, climbing to high places and operating heavy machinery. [Compliance with Medications] : The importance of compliance with medications was reinforced. [Teratogenicity] : Risks associated with AED use in pregnancy, teratogenicity and methods of contraception were discussed.  [Sleep Hygiene/Sleep Disruption Risks] : Sleep hygiene and the risks of sleep disruption were discussed. 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 unclear how diagnosis was made (clinically), possibly based on cystic appearance of left temporal lobe, however per my review this cyst may be intraventricular and benign in appearance on MRI.  Onset of events in the setting of emotional distress, this may raise the specter of the possibility of PNES. Prior video EEGs have also been unrevealing including with the use of sphenoidal electrodes.  Reports remote h/o of single GTC, assoc history of ICU admission associated with amnesia for the hospitalization may have been medication related, and is of unclear specificity/etiology. \par Migraines, frequent.  Migraine with aura is considered as a cause for some of her symptoms.\par H/o sleep walking at age 15.\par Past social h/o Traumatic loss of father, yrs ago with exacerbation of events during difficult breakup, .  \par Baseline refractory chronic headaches occurring up to 10-15 times per month now 2/wk or less. \par \par \par Plan:\par Increase Lamotrigine   to 250mg BID ER\par - reviewed seizure triggers\par -triptan PO prn migraine switch to Maratriptan 2.5\par -annual labs next week\par  -follow up in 3 months\par \par \par

## 2020-03-06 NOTE — PHYSICAL EXAM
[General Appearance - Alert] : alert [General Appearance - In No Acute Distress] : in no acute distress [General Appearance - Well Nourished] : well nourished [General Appearance - Well Developed] : well developed [General Appearance - Well-Appearing] : healthy appearing [Oriented To Time, Place, And Person] : oriented to person, place, and time [Impaired Insight] : insight and judgment were intact [Affect] : the affect was normal [Person] : oriented to person [Place] : oriented to place [Time] : oriented to time [Short Term Intact] : short term memory intact [Remote Intact] : remote memory intact [Registration Intact] : recent registration memory intact [Concentration Intact] : normal concentrating ability [Visual Intact] : visual attention was ~T not ~L decreased [Naming Objects] : no difficulty naming common objects [Repeating Phrases] : no difficulty repeating a phrase [Writing A Sentence] : no difficulty writing a sentence [Fluency] : fluency intact [Comprehension] : comprehension intact [Reading] : reading intact [Vocabulary] : adequate range of vocabulary [Cranial Nerves Optic (II)] : visual acuity intact bilaterally,  visual fields full to confrontation, pupils equal round and reactive to light [Cranial Nerves Oculomotor (III)] : extraocular motion intact [Cranial Nerves Trigeminal (V)] : facial sensation intact symmetrically [Cranial Nerves Facial (VII)] : face symmetrical [Cranial Nerves Vestibulocochlear (VIII)] : hearing was intact bilaterally [Cranial Nerves Glossopharyngeal (IX)] : tongue and palate midline [Cranial Nerves Accessory (XI - Cranial And Spinal)] : head turning and shoulder shrug symmetric [Cranial Nerves Hypoglossal (XII)] : there was no tongue deviation with protrusion [Motor Strength] : muscle strength was normal in all four extremities [No Muscle Atrophy] : normal bulk in all four extremities [Motor Handedness Left-Handed] : the patient is left hand dominant [Sensation Tactile Decrease] : light touch was intact [Sensation Pain / Temperature Decrease] : pain and temperature was intact [Sensation Vibration Decrease] : vibration was intact [Proprioception] : proprioception was intact [Abnormal Walk] : normal gait [Balance] : balance was intact [2+] : Ankle jerk left 2+ [Sclera] : the sclera and conjunctiva were normal [Outer Ear] : the ears and nose were normal in appearance [Hearing Threshold Finger Rub Not [place]] : hearing was normal [Neck Appearance] : the appearance of the neck was normal [Heart Rate And Rhythm] : heart rate was normal and rhythm regular [Abdomen Soft] : soft [No CVA Tenderness] : no ~M costovertebral angle tenderness [Nail Clubbing] : no clubbing  or cyanosis of the fingernails [Motor Tone] : muscle strength and tone were normal [] : no rash [Paresis Pronator Drift Right-Sided] : no pronator drift on the right [Paresis Pronator Drift Left-Sided] : no pronator drift on the left [Motor Strength Upper Extremities Bilaterally] : strength was normal in both upper extremities [Motor Strength Lower Extremities Bilaterally] : strength was normal in both lower extremities [Past-pointing] : there was no past-pointing [Tremor] : no tremor present [Plantar Reflex Right Only] : normal on the right [Plantar Reflex Left Only] : normal on the left [FreeTextEntry6] : familiar history of left handedness

## 2020-03-06 NOTE — CONSULT LETTER
[Fred Bhatti MD] : Fred Bhatti MD [Attending, Dept. of Neurology, Division of Epilepsy] : Attending, Dept. of Neurology, Division of Epilepsy [[facility]] : [facility] [ of Neurology] :  of Neurology

## 2020-03-06 NOTE — HISTORY OF PRESENT ILLNESS
[Left Handed] : left handed [Stress] : stress [Sleep Deprivation] : sleep deprivation [Postictal Confusion and Lethargy] : postictal confusion and lethargy [Grand Mal Status Epilepticus] : yes [] : yes [Family History of Seizures] : family history of seizures [Divalproex (Depakote)] : divalproex (Depakote) [Lamotrigine (Lamictal)] : lamotrigine (Lamictal) [Oxcarbazepine (Trileptal)] : oxcarbazepine (Trileptal) [Phenytoin (Dilantin)] : phenytoin (Dilantin) [Topiramate (Topamax)] : topiramate (Topamax) [ Complications] : ~T no  complications [Head Trauma] : no head trauma [Febrile Seizures] : no febrile seizures [Meningitis or Encephalitis] : no meningitis or encephalitis [Developmental Delay] : no developmental delay [Stroke] : no stroke  [FreeTextEntry1] : 15 years old [FreeTextEntry2] : 3 types described [FreeTextEntry3] : The first type is where the patient feels as if she is sleepwalking, blacking out, started around age 15 went away there after.  Was started on medications after this time.  The second type is where the patient feels as if things around her are going very fast.  That patient may recall what's going on with these type of events. Events baseline 15x/month. The third type is where it appears as if the patient is stretching, convulsing, foaming of mouth.  At age 19, had been admitted to [facility] then transferred to ICU at [facility] for "convulsions."  Poor recollection of that event.  Had seen Dr Askew in the past.  Was diagnosed with Left TLE in the past. [FreeTextEntry4] : The first event occurred after her father had passed away. [FreeTextEntry6] : 1-5 minutes in duration. [FreeTextEntry8] : emotional stress. [de-identified] : pt unsure, also unsure of last convulsion [de-identified] : reported [de-identified] : Elavil for headache? [de-identified] : Memory issues on TPM.  LTG was possibly harder to tolerate, and had GI sxs on it.

## 2020-03-06 NOTE — REVIEW OF SYSTEMS
[As Noted in HPI] : as noted in HPI [Memory Lapses or Loss] : memory loss [Decr. Concentrating Ability] : decreased concentrating ability [Seizures] : convulsions [Migraine Headache] : migraine headaches [Sleep Disturbances] : sleep disturbances [Negative] : Heme/Lymph [de-identified] : possible paraphrasias intermittently.

## 2020-05-12 RX ORDER — LAMOTRIGINE 100 MG/1
100 TABLET ORAL TWICE DAILY
Qty: 360 | Refills: 0 | Status: DISCONTINUED | COMMUNITY
Start: 2019-03-10 | End: 2020-05-12

## 2020-08-14 ENCOUNTER — RX CHANGE (OUTPATIENT)
Age: 35
End: 2020-08-14

## 2020-09-16 ENCOUNTER — APPOINTMENT (OUTPATIENT)
Dept: NEUROLOGY | Facility: CLINIC | Age: 35
End: 2020-09-16
Payer: COMMERCIAL

## 2020-09-16 VITALS
BODY MASS INDEX: 25.23 KG/M2 | HEIGHT: 66 IN | SYSTOLIC BLOOD PRESSURE: 118 MMHG | HEART RATE: 80 BPM | WEIGHT: 157 LBS | DIASTOLIC BLOOD PRESSURE: 85 MMHG

## 2020-09-16 VITALS — TEMPERATURE: 97.9 F

## 2020-09-16 DIAGNOSIS — F51.3 SLEEPWALKING [SOMNAMBULISM]: ICD-10-CM

## 2020-09-16 PROCEDURE — 99214 OFFICE O/P EST MOD 30 MIN: CPT

## 2020-09-16 NOTE — REVIEW OF SYSTEMS
[As Noted in HPI] : as noted in HPI [Memory Lapses or Loss] : memory loss [Seizures] : convulsions [Decr. Concentrating Ability] : decreased concentrating ability [Migraine Headache] : migraine headaches [Sleep Disturbances] : sleep disturbances [Negative] : Heme/Lymph [de-identified] : possible paraphrasias intermittently.

## 2020-09-16 NOTE — PHYSICAL EXAM
[General Appearance - Alert] : alert [General Appearance - In No Acute Distress] : in no acute distress [General Appearance - Well Nourished] : well nourished [General Appearance - Well-Appearing] : healthy appearing [General Appearance - Well Developed] : well developed [Oriented To Time, Place, And Person] : oriented to person, place, and time [Impaired Insight] : insight and judgment were intact [Affect] : the affect was normal [Person] : oriented to person [Place] : oriented to place [Time] : oriented to time [Short Term Intact] : short term memory intact [Remote Intact] : remote memory intact [Registration Intact] : recent registration memory intact [Concentration Intact] : normal concentrating ability [Visual Intact] : visual attention was ~T not ~L decreased [Repeating Phrases] : no difficulty repeating a phrase [Naming Objects] : no difficulty naming common objects [Fluency] : fluency intact [Writing A Sentence] : no difficulty writing a sentence [Comprehension] : comprehension intact [Reading] : reading intact [Vocabulary] : adequate range of vocabulary [Cranial Nerves Optic (II)] : visual acuity intact bilaterally,  visual fields full to confrontation, pupils equal round and reactive to light [Cranial Nerves Trigeminal (V)] : facial sensation intact symmetrically [Cranial Nerves Facial (VII)] : face symmetrical [Cranial Nerves Oculomotor (III)] : extraocular motion intact [Cranial Nerves Vestibulocochlear (VIII)] : hearing was intact bilaterally [Cranial Nerves Glossopharyngeal (IX)] : tongue and palate midline [Cranial Nerves Hypoglossal (XII)] : there was no tongue deviation with protrusion [Cranial Nerves Accessory (XI - Cranial And Spinal)] : head turning and shoulder shrug symmetric [Motor Strength] : muscle strength was normal in all four extremities [No Muscle Atrophy] : normal bulk in all four extremities [Motor Handedness Left-Handed] : the patient is left hand dominant [Sensation Pain / Temperature Decrease] : pain and temperature was intact [Sensation Vibration Decrease] : vibration was intact [Sensation Tactile Decrease] : light touch was intact [Abnormal Walk] : normal gait [Proprioception] : proprioception was intact [Balance] : balance was intact [2+] : Ankle jerk left 2+ [Outer Ear] : the ears and nose were normal in appearance [Sclera] : the sclera and conjunctiva were normal [Hearing Threshold Finger Rub Not [place]] : hearing was normal [Neck Appearance] : the appearance of the neck was normal [Heart Rate And Rhythm] : heart rate was normal and rhythm regular [No CVA Tenderness] : no ~M costovertebral angle tenderness [Abdomen Soft] : soft [Nail Clubbing] : no clubbing  or cyanosis of the fingernails [Motor Tone] : muscle strength and tone were normal [] : no rash [Paresis Pronator Drift Right-Sided] : no pronator drift on the right [Paresis Pronator Drift Left-Sided] : no pronator drift on the left [Motor Strength Upper Extremities Bilaterally] : strength was normal in both upper extremities [Motor Strength Lower Extremities Bilaterally] : strength was normal in both lower extremities [Past-pointing] : there was no past-pointing [Tremor] : no tremor present [Plantar Reflex Right Only] : normal on the right [Plantar Reflex Left Only] : normal on the left [FreeTextEntry6] : familiar history of left handedness

## 2020-09-16 NOTE — HISTORY OF PRESENT ILLNESS
[Left Handed] : left handed [Stress] : stress [Sleep Deprivation] : sleep deprivation [Postictal Confusion and Lethargy] : postictal confusion and lethargy [Grand Mal Status Epilepticus] : yes [Family History of Seizures] : family history of seizures [] : yes [Divalproex (Depakote)] : divalproex (Depakote) [Lamotrigine (Lamictal)] : lamotrigine (Lamictal) [Phenytoin (Dilantin)] : phenytoin (Dilantin) [Oxcarbazepine (Trileptal)] : oxcarbazepine (Trileptal) [Topiramate (Topamax)] : topiramate (Topamax) [ Complications] : ~T no  complications [Head Trauma] : no head trauma [Meningitis or Encephalitis] : no meningitis or encephalitis [Febrile Seizures] : no febrile seizures [Developmental Delay] : no developmental delay [Stroke] : no stroke  [FreeTextEntry1] : 15 years old [FreeTextEntry2] : 3 types described [FreeTextEntry3] : The first type is where the patient feels as if she is sleepwalking, blacking out, started around age 15 went away there after.  Was started on medications after this time.  The second type is where the patient feels as if things around her are going very fast.  That patient may recall what's going on with these type of events. Events baseline 15x/month. The third type is where it appears as if the patient is stretching, convulsing, foaming of mouth.  At age 19, had been admitted to [facility] then transferred to ICU at [facility] for "convulsions."  Poor recollection of that event.  Had seen Dr Askew in the past.  Was diagnosed with Left TLE in the past. [FreeTextEntry4] : The first event occurred after her father had passed away. [FreeTextEntry6] : 1-5 minutes in duration. [de-identified] : pt unsure, also unsure of last convulsion [FreeTextEntry8] : emotional stress. [de-identified] : Elavil for headache? [de-identified] : reported [de-identified] : Memory issues on TPM.  LTG was possibly harder to tolerate, and had GI sxs on it.

## 2020-09-16 NOTE — DISCUSSION/SUMMARY
[Simple Partial] : simple partial [Medically Refractory (seizure within the last year)] : Medically Refractory (seizure within the last year) [Complex Partial] : complex partial [Secondary Generalization] : secondary generalization [Idiopathic] : idiopathic  [Focal] : focal [Risks Associated with Driving/[place] Law] : As per my usual protocol, the patient was advised in regards to risks and driving privileges associated with the [place] Guidelines.  [Compliance with Medications] : The importance of compliance with medications was reinforced. [Safety Recommendations] : The patient was advised in regards to the risk of seizures and general seizure safety recommendations including not to be bathing alone, climbing to high places and operating heavy machinery. [Teratogenicity] : Risks associated with AED use in pregnancy, teratogenicity and methods of contraception were discussed.  [Sleep Hygiene/Sleep Disruption Risks] : Sleep hygiene and the risks of sleep disruption were discussed. 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 unclear how diagnosis was made (clinically), possibly based on cystic appearance of left temporal lobe, however per my review this cyst may be intraventricular and benign in appearance on MRI.  Onset of events in the setting of emotional distress, this may raise the specter of the possibility of PNES/FS. Prior video EEGs have also been unrevealing including with the use of sphenoidal electrodes.  Reports remote h/o of single GTC, assoc history of ICU admission associated with amnesia for the hospitalization may have been medication related, and is of unclear specificity/etiology.  Past social h/o Traumatic loss of father, yrs ago with exacerbation of events during difficult breakup, .  FmHx of sz.\par \par H/o sleep walking at age 15.  Events recently like this again (most c/w somnambulism) x3 this year\par \par Migraines, frequent.  Migraine with aura is considered as a cause for some of her symptoms.\par Baseline refractory chronic headaches occurring up to 10-15 times per month now q2/wk or less. \par \par Plan:\par Lamotrigine  relatively high dose 250mg BID ER.  Off TPM due to stones.\par - reviewed seizure triggers\par -triptan PO prn migraine switch to naratriptan 2.5mg, working better\par -annual labs next week\par -repeat AEEG x48hrs \par -sleep medicine eval for somnabulism\par -gene testing due to reported fmhx of sz, some lack of clarity re type of sz / dx\par -follow up in 3 months\par \par \par

## 2020-09-16 NOTE — CONSULT LETTER
[Fred Bhatti MD] : Fred Bhatti MD [Attending, Dept. of Neurology, Division of Epilepsy] : Attending, Dept. of Neurology, Division of Epilepsy [ of Neurology] :  of Neurology [[facility]] : [facility]

## 2020-09-16 NOTE — DATA REVIEWED
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  No MTS. \par MRI brain 2/2015: left temporal ventricular predominance, choroid fissure cyst.\par  [de-identified] : Reviewed EEG data from 5/2001 Dr Childers / 9/2002 Dr Almaraz: bisynchronous bursts of slowing frontally predominant with HV. \par On the pages of EEG provided, some slowing noted, but it appears nonspecific to me. \par VEEG: No events captured, no interictal abn, including with sphenoidals in 2007.  \par VEEG 1/2015: No events captured, no interictal abn. [de-identified] : prior CT w/ concern for nephrolithiasis. not seen on ultrasound. [FreeTextEntry1] : Labs: 10/2018 wbc 11, HCO3 19, lft nl, B12 248, LTG 3.1, TPM 13.5

## 2020-10-12 ENCOUNTER — APPOINTMENT (OUTPATIENT)
Dept: NEUROLOGY | Facility: CLINIC | Age: 35
End: 2020-10-12
Payer: COMMERCIAL

## 2020-10-12 VITALS — TEMPERATURE: 97.7 F

## 2020-10-12 PROCEDURE — 95816 EEG AWAKE AND DROWSY: CPT

## 2020-10-13 PROCEDURE — 95708 EEG WO VID EA 12-26HR UNMNTR: CPT

## 2020-10-14 PROCEDURE — 95721 EEG PHY/QHP>36<60 HR W/O VID: CPT

## 2020-10-14 PROCEDURE — 95708 EEG WO VID EA 12-26HR UNMNTR: CPT

## 2020-10-14 PROCEDURE — 95705 EEG W/O VID 2-12 HR UNMNTR: CPT

## 2020-10-14 PROCEDURE — 95700 EEG CONT REC W/VID EEG TECH: CPT

## 2020-11-04 ENCOUNTER — RX RENEWAL (OUTPATIENT)
Age: 35
End: 2020-11-04

## 2021-03-19 ENCOUNTER — APPOINTMENT (OUTPATIENT)
Dept: NEUROLOGY | Facility: CLINIC | Age: 36
End: 2021-03-19

## 2021-04-21 ENCOUNTER — APPOINTMENT (OUTPATIENT)
Dept: NEUROLOGY | Facility: CLINIC | Age: 36
End: 2021-04-21

## 2021-05-14 ENCOUNTER — RX RENEWAL (OUTPATIENT)
Age: 36
End: 2021-05-14

## 2021-05-28 ENCOUNTER — APPOINTMENT (OUTPATIENT)
Dept: NEUROLOGY | Facility: CLINIC | Age: 36
End: 2021-05-28

## 2021-06-23 ENCOUNTER — RX RENEWAL (OUTPATIENT)
Age: 36
End: 2021-06-23

## 2021-11-08 ENCOUNTER — APPOINTMENT (OUTPATIENT)
Dept: NEUROLOGY | Facility: CLINIC | Age: 36
End: 2021-11-08
Payer: COMMERCIAL

## 2021-11-08 VITALS
HEART RATE: 111 BPM | BODY MASS INDEX: 26.03 KG/M2 | WEIGHT: 162 LBS | DIASTOLIC BLOOD PRESSURE: 80 MMHG | SYSTOLIC BLOOD PRESSURE: 130 MMHG | HEIGHT: 66 IN

## 2021-11-08 PROCEDURE — 99213 OFFICE O/P EST LOW 20 MIN: CPT

## 2021-11-08 RX ORDER — NARATRIPTAN 2.5 MG/1
2.5 TABLET, FILM COATED ORAL
Qty: 8 | Refills: 0 | Status: ACTIVE | COMMUNITY
Start: 2020-03-06 | End: 1900-01-01

## 2021-11-08 RX ORDER — RIZATRIPTAN BENZOATE 10 MG/1
10 TABLET ORAL
Qty: 5 | Refills: 3 | Status: DISCONTINUED | COMMUNITY
Start: 2020-01-13 | End: 2021-11-08

## 2021-11-08 NOTE — REVIEW OF SYSTEMS
[As Noted in HPI] : as noted in HPI [Memory Lapses or Loss] : memory loss [Decr. Concentrating Ability] : decreased concentrating ability [Seizures] : convulsions [Migraine Headache] : migraine headaches [Sleep Disturbances] : sleep disturbances [Negative] : Heme/Lymph [de-identified] : possible paraphrasias intermittently.

## 2021-11-08 NOTE — CONSULT LETTER
[Fred Bhatti MD] : Fred Bhatti MD [Attending, Dept. of Neurology, Division of Epilepsy] : Attending, Dept. of Neurology, Division of Epilepsy [[facility]] : [facility] [ of Neurology] :  of Neurology

## 2021-11-08 NOTE — DATA REVIEWED
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  No MTS. \par MRI brain 2/2015: left temporal ventricular predominance, choroid fissure cyst.\par  [de-identified] : Reviewed EEG data from 5/2001 Dr Childers / 9/2002 Dr Almaraz: bisynchronous bursts of slowing frontally predominant with HV. \par On the pages of EEG provided, some slowing noted, but it appears nonspecific to me. \par VEEG: No events captured, no interictal abn, including with sphenoidals in 2007.  \par VEEG 1/2015: No events captured, no interictal abn. [de-identified] : prior CT w/ concern for nephrolithiasis. not seen on ultrasound. [FreeTextEntry1] : Labs: 10/2018 wbc 11, HCO3 19, lft nl, B12 248, LTG 3.1, TPM 13.5

## 2021-11-08 NOTE — HISTORY OF PRESENT ILLNESS
[Left Handed] : left handed [Stress] : stress [Sleep Deprivation] : sleep deprivation [Postictal Confusion and Lethargy] : postictal confusion and lethargy [Grand Mal Status Epilepticus] : yes [] : yes [Family History of Seizures] : family history of seizures [Divalproex (Depakote)] : divalproex (Depakote) [Lamotrigine (Lamictal)] : lamotrigine (Lamictal) [Oxcarbazepine (Trileptal)] : oxcarbazepine (Trileptal) [Phenytoin (Dilantin)] : phenytoin (Dilantin) [Topiramate (Topamax)] : topiramate (Topamax) [ Complications] : ~T no  complications [Head Trauma] : no head trauma [Febrile Seizures] : no febrile seizures [Meningitis or Encephalitis] : no meningitis or encephalitis [Developmental Delay] : no developmental delay [Stroke] : no stroke  [FreeTextEntry1] : 15 years old [FreeTextEntry2] : 3 types described [FreeTextEntry3] : The first type is where the patient feels as if she is sleepwalking, blacking out, started around age 15 went away there after.  Was started on medications after this time.  The second type is where the patient feels as if things around her are going very fast.  That patient may recall what's going on with these type of events. Events baseline 15x/month. The third type is where it appears as if the patient is stretching, convulsing, foaming of mouth.  At age 19, had been admitted to [facility] then transferred to ICU at [facility] for "convulsions."  Poor recollection of that event.  Had seen Dr Askew in the past.  Was diagnosed with Left TLE in the past. [FreeTextEntry4] : The first event occurred after her father had passed away. [FreeTextEntry6] : 1-5 minutes in duration. [FreeTextEntry8] : emotional stress. [de-identified] : pt unsure, also unsure of last convulsion [de-identified] : reported [de-identified] : Elavil for headache? [de-identified] : Memory issues on TPM.  LTG was possibly harder to tolerate, and had GI sxs on it.

## 2021-11-08 NOTE — PHYSICAL EXAM
[General Appearance - Alert] : alert [General Appearance - In No Acute Distress] : in no acute distress [General Appearance - Well Nourished] : well nourished [General Appearance - Well Developed] : well developed [General Appearance - Well-Appearing] : healthy appearing [Oriented To Time, Place, And Person] : oriented to person, place, and time [Impaired Insight] : insight and judgment were intact [Affect] : the affect was normal [Person] : oriented to person [Place] : oriented to place [Time] : oriented to time [Short Term Intact] : short term memory intact [Remote Intact] : remote memory intact [Registration Intact] : recent registration memory intact [Concentration Intact] : normal concentrating ability [Visual Intact] : visual attention was ~T not ~L decreased [Naming Objects] : no difficulty naming common objects [Repeating Phrases] : no difficulty repeating a phrase [Writing A Sentence] : no difficulty writing a sentence [Fluency] : fluency intact [Comprehension] : comprehension intact [Reading] : reading intact [Vocabulary] : adequate range of vocabulary [Cranial Nerves Optic (II)] : visual acuity intact bilaterally,  visual fields full to confrontation, pupils equal round and reactive to light [Cranial Nerves Oculomotor (III)] : extraocular motion intact [Cranial Nerves Trigeminal (V)] : facial sensation intact symmetrically [Cranial Nerves Facial (VII)] : face symmetrical [Cranial Nerves Vestibulocochlear (VIII)] : hearing was intact bilaterally [Cranial Nerves Glossopharyngeal (IX)] : tongue and palate midline [Cranial Nerves Accessory (XI - Cranial And Spinal)] : head turning and shoulder shrug symmetric [Cranial Nerves Hypoglossal (XII)] : there was no tongue deviation with protrusion [Motor Strength] : muscle strength was normal in all four extremities [No Muscle Atrophy] : normal bulk in all four extremities [Motor Handedness Left-Handed] : the patient is left hand dominant [Sensation Tactile Decrease] : light touch was intact [Sensation Pain / Temperature Decrease] : pain and temperature was intact [Sensation Vibration Decrease] : vibration was intact [Proprioception] : proprioception was intact [Abnormal Walk] : normal gait [Balance] : balance was intact [2+] : Ankle jerk left 2+ [Sclera] : the sclera and conjunctiva were normal [Outer Ear] : the ears and nose were normal in appearance [Hearing Threshold Finger Rub Not [place]] : hearing was normal [Neck Appearance] : the appearance of the neck was normal [Heart Rate And Rhythm] : heart rate was normal and rhythm regular [Abdomen Soft] : soft [No CVA Tenderness] : no ~M costovertebral angle tenderness [Motor Tone] : muscle strength and tone were normal [Nail Clubbing] : no clubbing  or cyanosis of the fingernails [] : no rash [Paresis Pronator Drift Right-Sided] : no pronator drift on the right [Paresis Pronator Drift Left-Sided] : no pronator drift on the left [Motor Strength Upper Extremities Bilaterally] : strength was normal in both upper extremities [Motor Strength Lower Extremities Bilaterally] : strength was normal in both lower extremities [Past-pointing] : there was no past-pointing [Tremor] : no tremor present [Plantar Reflex Right Only] : normal on the right [Plantar Reflex Left Only] : normal on the left [FreeTextEntry6] : familiar history of left handedness [Optic Disc Abnormality] : the optic disc were normal in size and color [Oropharynx] : the oropharynx was normal

## 2021-11-08 NOTE — DISCUSSION/SUMMARY
[Medically Refractory (seizure within the last year)] : Medically Refractory (seizure within the last year) [Simple Partial] : simple partial [Complex Partial] : complex partial [Secondary Generalization] : secondary generalization [Idiopathic] : idiopathic  [Focal] : focal [Risks Associated with Driving/[place] Law] : As per my usual protocol, the patient was advised in regards to risks and driving privileges associated with the [place] Guidelines.  [Safety Recommendations] : The patient was advised in regards to the risk of seizures and general seizure safety recommendations including not to be bathing alone, climbing to high places and operating heavy machinery. [Compliance with Medications] : The importance of compliance with medications was reinforced. [Teratogenicity] : Risks associated with AED use in pregnancy, teratogenicity and methods of contraception were discussed.  [Sleep Hygiene/Sleep Disruption Risks] : Sleep hygiene and the risks of sleep disruption were discussed. 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 unclear how diagnosis was made (clinically), possibly based on cystic appearance of left temporal lobe, however per my review this cyst may be intraventricular and benign in appearance on MRI.  Onset of events in the setting of emotional distress, this may raise the specter of the possibility of PNES/FS. Prior video EEGs have also been unrevealing including with the use of sphenoidal electrodes.  Reports remote h/o of single GTC, assoc history of ICU admission associated with amnesia for the hospitalization may have been medication related, and is of unclear specificity/etiology.  Past social h/o Traumatic loss of father, yrs ago with exacerbation of events during difficult breakup, .  FmHx of sz.\par \par H/o sleep walking at age 15.  Events recently like this again (most c/w somnambulism) x3 this year\par \par Migraines, frequent.  Migraine with aura is considered as a cause for some of her symptoms.\par Baseline refractory chronic headaches occurring up to 10-15 times per month now q2/wk or less. \par \par Repeat AEEG x48hrs  nl \par \par Plan:\par Lamotrigine  relatively high dose 250mg BID ER.  Off TPM due to kidney stones.\par - reviewed seizure triggers\par -triptan PO prn migraine switch to naratriptan 2.5mg, working better\par -annual labs next week\par -sleep medicine eval for somnabulism\par -gene testing due to reported fmhx of sz, some lack of clarity re type of sz / dx\par -HA eval\par -follow up in 3 months\par \par \par

## 2021-11-10 ENCOUNTER — RX RENEWAL (OUTPATIENT)
Age: 36
End: 2021-11-10

## 2021-12-06 ENCOUNTER — RX CHANGE (OUTPATIENT)
Age: 36
End: 2021-12-06

## 2021-12-06 RX ORDER — AMITRIPTYLINE HYDROCHLORIDE 25 MG/1
25 TABLET, FILM COATED ORAL
Qty: 60 | Refills: 3 | Status: DISCONTINUED | COMMUNITY
Start: 2021-11-08 | End: 2021-12-06

## 2022-03-31 ENCOUNTER — APPOINTMENT (OUTPATIENT)
Dept: PAIN MANAGEMENT | Facility: CLINIC | Age: 37
End: 2022-03-31

## 2022-05-10 ENCOUNTER — APPOINTMENT (OUTPATIENT)
Dept: NEUROLOGY | Facility: CLINIC | Age: 37
End: 2022-05-10

## 2022-07-08 ENCOUNTER — APPOINTMENT (OUTPATIENT)
Dept: NEUROLOGY | Facility: CLINIC | Age: 37
End: 2022-07-08

## 2022-08-09 ENCOUNTER — RX RENEWAL (OUTPATIENT)
Age: 37
End: 2022-08-09

## 2022-10-07 ENCOUNTER — APPOINTMENT (OUTPATIENT)
Dept: NEUROLOGY | Facility: CLINIC | Age: 37
End: 2022-10-07

## 2022-11-04 ENCOUNTER — APPOINTMENT (OUTPATIENT)
Dept: NEUROLOGY | Facility: CLINIC | Age: 37
End: 2022-11-04

## 2023-01-24 ENCOUNTER — RX RENEWAL (OUTPATIENT)
Age: 38
End: 2023-01-24

## 2023-03-03 ENCOUNTER — APPOINTMENT (OUTPATIENT)
Dept: NEUROLOGY | Facility: CLINIC | Age: 38
End: 2023-03-03
Payer: COMMERCIAL

## 2023-03-03 VITALS
HEIGHT: 66 IN | WEIGHT: 145 LBS | BODY MASS INDEX: 23.3 KG/M2 | DIASTOLIC BLOOD PRESSURE: 77 MMHG | HEART RATE: 71 BPM | SYSTOLIC BLOOD PRESSURE: 112 MMHG

## 2023-03-03 PROCEDURE — 99213 OFFICE O/P EST LOW 20 MIN: CPT

## 2023-03-03 NOTE — CONSULT LETTER
[Fred Bhatti MD] : Fred Bhatti MD [Attending, Dept. of Neurology, Division of Epilepsy] : Attending, Dept. of Neurology, Division of Epilepsy [[facility]] : [facility] [ of Neurology] :  of Neurology

## 2023-03-04 NOTE — DATA REVIEWED
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  No MTS. \par MRI brain 2/2015: left temporal ventricular predominance, choroid fissure cyst.\par  [de-identified] : Reviewed EEG data from 5/2001 Dr Childers / 9/2002 Dr Almaraz: bisynchronous bursts of slowing frontally predominant with HV. \par On the pages of EEG provided, some slowing noted, but it appears nonspecific to me. \par VEEG: No events captured, no interictal abn, including with sphenoidals in 2007.  \par VEEG 1/2015: No events captured, no interictal abn. [de-identified] : prior CT w/ concern for nephrolithiasis. not seen on ultrasound. [FreeTextEntry1] : Labs: 10/2018 wbc 11, HCO3 19, lft nl, B12 248, LTG 3.1, TPM 13.5

## 2023-03-04 NOTE — PHYSICAL EXAM
[General Appearance - Alert] : alert [General Appearance - In No Acute Distress] : in no acute distress [General Appearance - Well Nourished] : well nourished [General Appearance - Well Developed] : well developed [General Appearance - Well-Appearing] : healthy appearing [Oriented To Time, Place, And Person] : oriented to person, place, and time [Impaired Insight] : insight and judgment were intact [Affect] : the affect was normal [Person] : oriented to person [Place] : oriented to place [Time] : oriented to time [Short Term Intact] : short term memory intact [Remote Intact] : remote memory intact [Registration Intact] : recent registration memory intact [Concentration Intact] : normal concentrating ability [Visual Intact] : visual attention was ~T not ~L decreased [Naming Objects] : no difficulty naming common objects [Repeating Phrases] : no difficulty repeating a phrase [Writing A Sentence] : no difficulty writing a sentence [Fluency] : fluency intact [Comprehension] : comprehension intact [Reading] : reading intact [Vocabulary] : adequate range of vocabulary [Cranial Nerves Optic (II)] : visual acuity intact bilaterally,  visual fields full to confrontation, pupils equal round and reactive to light [Cranial Nerves Oculomotor (III)] : extraocular motion intact [Cranial Nerves Trigeminal (V)] : facial sensation intact symmetrically [Cranial Nerves Facial (VII)] : face symmetrical [Cranial Nerves Vestibulocochlear (VIII)] : hearing was intact bilaterally [Cranial Nerves Glossopharyngeal (IX)] : tongue and palate midline [Cranial Nerves Accessory (XI - Cranial And Spinal)] : head turning and shoulder shrug symmetric [Cranial Nerves Hypoglossal (XII)] : there was no tongue deviation with protrusion [Motor Strength] : muscle strength was normal in all four extremities [No Muscle Atrophy] : normal bulk in all four extremities [Motor Handedness Left-Handed] : the patient is left hand dominant [Sensation Tactile Decrease] : light touch was intact [Sensation Pain / Temperature Decrease] : pain and temperature was intact [Sensation Vibration Decrease] : vibration was intact [Proprioception] : proprioception was intact [Abnormal Walk] : normal gait [Balance] : balance was intact [2+] : Ankle jerk left 2+ [Sclera] : the sclera and conjunctiva were normal [Optic Disc Abnormality] : the optic disc were normal in size and color [Outer Ear] : the ears and nose were normal in appearance [Hearing Threshold Finger Rub Not [place]] : hearing was normal [Oropharynx] : the oropharynx was normal [Neck Appearance] : the appearance of the neck was normal [Heart Rate And Rhythm] : heart rate was normal and rhythm regular [Abdomen Soft] : soft [No CVA Tenderness] : no ~M costovertebral angle tenderness [Nail Clubbing] : no clubbing  or cyanosis of the fingernails [Motor Tone] : muscle strength and tone were normal [] : no rash [Paresis Pronator Drift Right-Sided] : no pronator drift on the right [Paresis Pronator Drift Left-Sided] : no pronator drift on the left [Motor Strength Lower Extremities Bilaterally] : strength was normal in both lower extremities [Motor Strength Upper Extremities Bilaterally] : strength was normal in both upper extremities [Past-pointing] : there was no past-pointing [Tremor] : no tremor present [Plantar Reflex Right Only] : normal on the right [Plantar Reflex Left Only] : normal on the left [FreeTextEntry6] : familiar history of left handedness

## 2023-03-04 NOTE — HISTORY OF PRESENT ILLNESS
[Left Handed] : left handed [Stress] : stress [Sleep Deprivation] : sleep deprivation [Postictal Confusion and Lethargy] : postictal confusion and lethargy [Grand Mal Status Epilepticus] : yes [] : yes [Family History of Seizures] : family history of seizures [Divalproex (Depakote)] : divalproex (Depakote) [Lamotrigine (Lamictal)] : lamotrigine (Lamictal) [Oxcarbazepine (Trileptal)] : oxcarbazepine (Trileptal) [Phenytoin (Dilantin)] : phenytoin (Dilantin) [Topiramate (Topamax)] : topiramate (Topamax) [ Complications] : ~T no  complications [Head Trauma] : no head trauma [Febrile Seizures] : no febrile seizures [Meningitis or Encephalitis] : no meningitis or encephalitis [Developmental Delay] : no developmental delay [Stroke] : no stroke  [FreeTextEntry1] : 15 years old [FreeTextEntry2] : 3 types described [FreeTextEntry3] : The first type is where the patient feels as if she is sleepwalking, blacking out, started around age 15 went away there after.  Was started on medications after this time.  The second type is where the patient feels as if things around her are going very fast.  That patient may recall what's going on with these type of events. Events baseline 15x/month. The third type is where it appears as if the patient is stretching, convulsing, foaming of mouth.  At age 19, had been admitted to [facility] then transferred to ICU at [facility] for "convulsions."  Poor recollection of that event.  Had seen Dr Askew in the past.  Was diagnosed with Left TLE in the past. [FreeTextEntry4] : The first event occurred after her father had passed away. [FreeTextEntry6] : 1-5 minutes in duration. [FreeTextEntry8] : emotional stress. [de-identified] : pt unsure, also unsure of last convulsion [de-identified] : reported [de-identified] : Elavil for headache? [de-identified] : Memory issues on TPM.  LTG was possibly harder to tolerate, and had GI sxs on it.

## 2023-03-04 NOTE — REVIEW OF SYSTEMS
[As Noted in HPI] : as noted in HPI [Memory Lapses or Loss] : memory loss [Decr. Concentrating Ability] : decreased concentrating ability [Seizures] : convulsions [Migraine Headache] : migraine headaches [Sleep Disturbances] : sleep disturbances [Negative] : Heme/Lymph [de-identified] : possible paraphrasias intermittently.

## 2023-03-04 NOTE — DISCUSSION/SUMMARY
[Medically Refractory (seizure within the last year)] : Medically Refractory (seizure within the last year) [Simple Partial] : simple partial [Complex Partial] : complex partial [Secondary Generalization] : secondary generalization [Idiopathic] : idiopathic  [Focal] : focal [Risks Associated with Driving/[place] Law] : As per my usual protocol, the patient was advised in regards to risks and driving privileges associated with the [place] Guidelines.  [Safety Recommendations] : The patient was advised in regards to the risk of seizures and general seizure safety recommendations including not to be bathing alone, climbing to high places and operating heavy machinery. [Compliance with Medications] : The importance of compliance with medications was reinforced. [Teratogenicity] : Risks associated with AED use in pregnancy, teratogenicity and methods of contraception were discussed.  [Sleep Hygiene/Sleep Disruption Risks] : Sleep hygiene and the risks of sleep disruption were discussed. 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 unclear how diagnosis was made (clinically), possibly based on cystic appearance of left temporal lobe, however per my review this cyst may be intraventricular and benign in appearance on MRI.  Onset of events in the setting of emotional distress, this may raise the specter of the possibility of PNES/FS. Prior video EEGs have also been unrevealing including with the use of sphenoidal electrodes.  Reports remote h/o of single GTC, assoc history of ICU admission associated with amnesia for the hospitalization may have been medication related, and is of unclear specificity/etiology.  Past social h/o Traumatic loss of father, yrs ago with exacerbation of events during difficult breakup, .  FmHx of sz.\par \par H/o sleep walking at age 15.  Events recently like this again (most c/w somnambulism) x3 this year\par \par Migraines, frequent.  Migraine with aura is considered as a cause for some of her symptoms.\par Baseline refractory chronic headaches occurring up to 10-15 times per month now q2/wk or less. \par \par Repeat AEEG x48hrs  nl \par \par Plan:\par -Lamotrigine  250 mg BID ER.  \par - reviewed seizure triggers\par -annual labs\par -not planning pregnancy IUD in place\par -gene testing due to reported fmhx of sz, some lack of clarity re type of sz / dx\par -follow up in 4-6  months with Dr Bhatti\par \par \par

## 2023-05-02 ENCOUNTER — RX RENEWAL (OUTPATIENT)
Age: 38
End: 2023-05-02

## 2023-09-15 ENCOUNTER — NON-APPOINTMENT (OUTPATIENT)
Age: 38
End: 2023-09-15

## 2023-09-29 ENCOUNTER — APPOINTMENT (OUTPATIENT)
Dept: NEUROLOGY | Facility: CLINIC | Age: 38
End: 2023-09-29

## 2023-10-06 ENCOUNTER — APPOINTMENT (OUTPATIENT)
Dept: NEUROLOGY | Facility: CLINIC | Age: 38
End: 2023-10-06

## 2023-11-22 ENCOUNTER — RX RENEWAL (OUTPATIENT)
Age: 38
End: 2023-11-22

## 2024-02-06 ENCOUNTER — APPOINTMENT (OUTPATIENT)
Dept: ORTHOPEDIC SURGERY | Facility: CLINIC | Age: 39
End: 2024-02-06
Payer: COMMERCIAL

## 2024-02-06 VITALS — BODY MASS INDEX: 23.3 KG/M2 | WEIGHT: 145 LBS | HEIGHT: 66 IN

## 2024-02-06 DIAGNOSIS — G56.02 CARPAL TUNNEL SYNDROME, LEFT UPPER LIMB: ICD-10-CM

## 2024-02-06 PROCEDURE — J3490M: CUSTOM

## 2024-02-06 PROCEDURE — 99203 OFFICE O/P NEW LOW 30 MIN: CPT | Mod: 25

## 2024-02-06 PROCEDURE — 20526 THER INJECTION CARP TUNNEL: CPT | Mod: LT

## 2024-02-06 NOTE — PHYSICAL EXAM
[de-identified] : L forearm Tender Radial tunnel Min swelling   L hand:  Tender volar wrist  Good finger ROM  +Tinels  +Phalens  +Compression test  Decreased sensation median nerve distribution

## 2024-02-06 NOTE — HISTORY OF PRESENT ILLNESS
[de-identified] : L hand pain since September helping nephew with a project Getting worse  Denies numbness or tinglin  Brace, NSIADS, ice, heat have not helped   [Gradual] : gradual [6] : 6 [5] : 5 [Dull/Aching] : dull/aching [Tingling] : tingling [Nothing helps with pain getting better] : Nothing helps with pain getting better [] : no [FreeTextEntry1] : L hand  [FreeTextEntry3] : few months  [FreeTextEntry5] : she has pain and numbness [FreeTextEntry6] : numbness [FreeTextEntry7] : up the arm  [de-identified] : activity

## 2024-02-06 NOTE — ASSESSMENT
[FreeTextEntry1] : L Carpal tunnel injection was performed because of pain inflammation Anesthesia: ethyl chloride sprayed topically Celestone 6mg: An injection of Celestone 1cc Lidocaine: An injection of Lidocaine 1% 1cc Marcaine: An injection of Marcaine 0.5% 1cc  Patient has tried OTC's including aspirin, Ibuprofen, Aleve etc or prescription NSAIDS, and/or exercises at home and/ or physical therapy without satisfactory response. After verbal consent using sterile preparation and technique. The risks, benefits, and alternatives to cortisone injection were explained in full to the patient. Risks outlined include but are not limited to infection, sepsis, bleeding, scarring, skin discoloration, temporary increase in pain, syncopal episode, failure to resolve symptoms, allergic reaction, symptom recurrence, and elevation of blood sugar in diabetics. Patient understood the risks. All questions were answered. After discussion of options, patient requested an injection. Oral informed consent was obtained and sterile prep was done of the injection site. Sterile technique was utilized for the procedure including the preparation of the solutions used for the injection. Patient tolerated the procedure well. Advised to ice the injection site this evening. Prep with betadine locally to site. Sterile technique used
General

## 2024-02-15 ENCOUNTER — APPOINTMENT (OUTPATIENT)
Dept: NEUROLOGY | Facility: CLINIC | Age: 39
End: 2024-02-15
Payer: COMMERCIAL

## 2024-02-15 PROCEDURE — 95911 NRV CNDJ TEST 9-10 STUDIES: CPT

## 2024-02-15 PROCEDURE — 95886 MUSC TEST DONE W/N TEST COMP: CPT

## 2024-02-26 ENCOUNTER — APPOINTMENT (OUTPATIENT)
Dept: ORTHOPEDIC SURGERY | Facility: CLINIC | Age: 39
End: 2024-02-26
Payer: COMMERCIAL

## 2024-02-26 DIAGNOSIS — M77.8 OTHER ENTHESOPATHIES, NOT ELSEWHERE CLASSIFIED: ICD-10-CM

## 2024-02-26 PROCEDURE — 20550 NJX 1 TENDON SHEATH/LIGAMENT: CPT | Mod: LT

## 2024-02-26 PROCEDURE — 99213 OFFICE O/P EST LOW 20 MIN: CPT | Mod: 25

## 2024-02-26 PROCEDURE — J3490M: CUSTOM

## 2024-02-26 NOTE — HISTORY OF PRESENT ILLNESS
[5] : 5 [4] : 4 [Dull/Aching] : dull/aching [Tingling] : tingling [de-identified] : EMG L UE normal  L CT injection did not help  L hand MCP, PIP pain and stiffness L forearm pain  [FreeTextEntry1] : hands [de-identified] : L wrist inj brace

## 2024-02-26 NOTE — ASSESSMENT
[FreeTextEntry1] : L forearm tendon sheath injection was performed because of pain inflammation and stiffness Anesthesia: ethyl chloride sprayed topically Celestone 6mg: An injection of Celestone 1cc Lidocaine: An injection of Lidocaine 1% 1cc Marcaine: An injection of Marcaine 0.5% 1cc  Patient has tried OTC's including aspirin, Ibuprofen, Aleve etc or prescription NSAIDS, and/or exercises at home and/ or physical therapy without satisfactory response. After verbal consent using sterile preparation and technique. The risks, benefits, and alternatives to cortisone injection were explained in full to the patient. Risks outlined include but are not limited to infection, sepsis, bleeding, scarring, skin discoloration, temporary increase in pain, syncopal episode, failure to resolve symptoms, allergic reaction, symptom recurrence, and elevation of blood sugar in diabetics. Patient understood the risks. All questions were answered. After discussion of options, patient requested an injection. Oral informed consent was obtained and sterile prep was done of the injection site. Sterile technique was utilized for the procedure including the preparation of the solutions used for the injection. Patient tolerated the procedure well. Advised to ice the injection site this evening. Prep with betadine locally to site. Sterile technique used

## 2024-03-15 ENCOUNTER — APPOINTMENT (OUTPATIENT)
Dept: NEUROLOGY | Facility: CLINIC | Age: 39
End: 2024-03-15
Payer: COMMERCIAL

## 2024-03-15 VITALS
DIASTOLIC BLOOD PRESSURE: 76 MMHG | SYSTOLIC BLOOD PRESSURE: 110 MMHG | BODY MASS INDEX: 21.53 KG/M2 | HEIGHT: 66 IN | HEART RATE: 81 BPM | WEIGHT: 134 LBS

## 2024-03-15 DIAGNOSIS — R40.4 TRANSIENT ALTERATION OF AWARENESS: ICD-10-CM

## 2024-03-15 DIAGNOSIS — G43.909 MIGRAINE, UNSPECIFIED, NOT INTRACTABLE, W/OUT STATUS MIGRAINOSUS: ICD-10-CM

## 2024-03-15 PROCEDURE — G2211 COMPLEX E/M VISIT ADD ON: CPT

## 2024-03-15 PROCEDURE — 99204 OFFICE O/P NEW MOD 45 MIN: CPT

## 2024-03-15 RX ORDER — AMITRIPTYLINE HYDROCHLORIDE 25 MG/1
25 TABLET, FILM COATED ORAL
Qty: 180 | Refills: 1 | Status: DISCONTINUED | COMMUNITY
Start: 2021-12-06 | End: 2024-03-15

## 2024-03-15 NOTE — HISTORY OF PRESENT ILLNESS
[Stress] : stress [Left Handed] : left handed [Postictal Confusion and Lethargy] : postictal confusion and lethargy [Sleep Deprivation] : sleep deprivation [Grand Mal Status Epilepticus] : yes [] : yes [Family History of Seizures] : family history of seizures [ Complications] : ~T no  complications [Head Trauma] : no head trauma [Febrile Seizures] : no febrile seizures [Developmental Delay] : no developmental delay [Meningitis or Encephalitis] : no meningitis or encephalitis [Divalproex (Depakote)] : divalproex (Depakote) [Stroke] : no stroke  [Lamotrigine (Lamictal)] : lamotrigine (Lamictal) [Oxcarbazepine (Trileptal)] : oxcarbazepine (Trileptal) [Phenytoin (Dilantin)] : phenytoin (Dilantin) [Topiramate (Topamax)] : topiramate (Topamax) [FreeTextEntry1] : 15 years old [FreeTextEntry3] : The first type is where the patient feels as if she is sleepwalking, blacking out, started around age 15 went away there after.  Was started on medications after this time.  The second type is where the patient feels as if things around her are going very fast.  That patient may recall what's going on with these type of events. Events baseline 15x/month. The third type is where it appears as if the patient is stretching, convulsing, foaming of mouth.  At age 19, had been admitted to [facility] then transferred to ICU at [facility] for "convulsions."  Poor recollection of that event.  Had seen Dr Askew in the past.  Was diagnosed with Left TLE in the past. [FreeTextEntry2] : 3 types described [FreeTextEntry4] : The first event occurred after her father had passed away. [FreeTextEntry6] : 1-5 minutes in duration. [FreeTextEntry8] : emotional stress. [de-identified] : pt unsure, also unsure of last convulsion [de-identified] : reported [de-identified] : Elavil for headache? [de-identified] : Memory issues on TPM.  LTG was possibly harder to tolerate, and had GI sxs on it.

## 2024-03-15 NOTE — PHYSICAL EXAM
[General Appearance - Alert] : alert [General Appearance - In No Acute Distress] : in no acute distress [General Appearance - Well Nourished] : well nourished [General Appearance - Well Developed] : well developed [General Appearance - Well-Appearing] : healthy appearing [Oriented To Time, Place, And Person] : oriented to person, place, and time [Affect] : the affect was normal [Impaired Insight] : insight and judgment were intact [Person] : oriented to person [Time] : oriented to time [Place] : oriented to place [Short Term Intact] : short term memory intact [Remote Intact] : remote memory intact [Concentration Intact] : normal concentrating ability [Registration Intact] : recent registration memory intact [Visual Intact] : visual attention was ~T not ~L decreased [Naming Objects] : no difficulty naming common objects [Writing A Sentence] : no difficulty writing a sentence [Repeating Phrases] : no difficulty repeating a phrase [Fluency] : fluency intact [Reading] : reading intact [Comprehension] : comprehension intact [Vocabulary] : adequate range of vocabulary [Cranial Nerves Optic (II)] : visual acuity intact bilaterally,  visual fields full to confrontation, pupils equal round and reactive to light [Cranial Nerves Oculomotor (III)] : extraocular motion intact [Cranial Nerves Trigeminal (V)] : facial sensation intact symmetrically [Cranial Nerves Facial (VII)] : face symmetrical [Cranial Nerves Vestibulocochlear (VIII)] : hearing was intact bilaterally [Cranial Nerves Accessory (XI - Cranial And Spinal)] : head turning and shoulder shrug symmetric [Cranial Nerves Glossopharyngeal (IX)] : tongue and palate midline [Cranial Nerves Hypoglossal (XII)] : there was no tongue deviation with protrusion [No Muscle Atrophy] : normal bulk in all four extremities [Motor Strength] : muscle strength was normal in all four extremities [Paresis Pronator Drift Right-Sided] : no pronator drift on the right [Motor Handedness Left-Handed] : the patient is left hand dominant [Paresis Pronator Drift Left-Sided] : no pronator drift on the left [Motor Strength Upper Extremities Bilaterally] : strength was normal in both upper extremities [Motor Strength Lower Extremities Bilaterally] : strength was normal in both lower extremities [Sensation Tactile Decrease] : light touch was intact [Sensation Pain / Temperature Decrease] : pain and temperature was intact [Sensation Vibration Decrease] : vibration was intact [Abnormal Walk] : normal gait [Proprioception] : proprioception was intact [Balance] : balance was intact [Past-pointing] : there was no past-pointing [Tremor] : no tremor present [2+] : Ankle jerk left 2+ [Plantar Reflex Left Only] : normal on the left [Plantar Reflex Right Only] : normal on the right [FreeTextEntry6] : familiar history of left handedness [Sclera] : the sclera and conjunctiva were normal [Outer Ear] : the ears and nose were normal in appearance [Optic Disc Abnormality] : the optic disc were normal in size and color [Oropharynx] : the oropharynx was normal [Hearing Threshold Finger Rub Not [place]] : hearing was normal [Neck Appearance] : the appearance of the neck was normal [Heart Rate And Rhythm] : heart rate was normal and rhythm regular [Abdomen Soft] : soft [No CVA Tenderness] : no ~M costovertebral angle tenderness [Nail Clubbing] : no clubbing  or cyanosis of the fingernails [Motor Tone] : muscle strength and tone were normal [] : no rash

## 2024-03-15 NOTE — CONSULT LETTER
[Fred Bhatti MD] : Fred Bhatti MD [Attending, Dept. of Neurology, Division of Epilepsy] : Attending, Dept. of Neurology, Division of Epilepsy [[facility]] : [facility] [ of Neurology] :  of Neurology

## 2024-03-15 NOTE — DATA REVIEWED
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  No MTS. \par  MRI brain 2/2015: left temporal ventricular predominance, choroid fissure cyst.\par   [de-identified] : Reviewed EEG data from 5/2001 Dr Childers / 9/2002 Dr Almaraz: bisynchronous bursts of slowing frontally predominant with HV. \par  On the pages of EEG provided, some slowing noted, but it appears nonspecific to me. \par  VEEG: No events captured, no interictal abn, including with sphenoidals in 2007.  \par  VEEG 1/2015: No events captured, no interictal abn. [de-identified] : prior CT w/ concern for nephrolithiasis. not seen on ultrasound. [FreeTextEntry1] : Labs: 10/2018 wbc 11, HCO3 19, lft nl, B12 248, LTG 3.1, TPM 13.5

## 2024-03-15 NOTE — DISCUSSION/SUMMARY
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 unclear how diagnosis was made (clinically), possibly based on cystic appearance of left temporal lobe, however per my review this cyst may be intraventricular and benign in appearance on MRI.  Onset of events in the setting of emotional distress, this may raise the specter of the possibility of PNES/FS. Prior video EEGs have also been unrevealing including with the use of sphenoidal electrodes.  Reports remote h/o of single GTC, assoc history of ICU admission associated with amnesia for the hospitalization may have been medication related, and is of unclear specificity/etiology.  Past social h/o Traumatic loss of father, yrs ago with exacerbation of events during difficult breakup, .  FmHx of sz.  H/o sleep walking at age 15.  Events recently like this again (most c/w somnambulism) x3 this year  Migraines, frequent.  Migraine with aura is considered as a cause for some of her symptoms. Baseline refractory chronic headaches occurring up to 10-15 times per month now q2/wk or less.   Repeat AEEG x48hrs  nl   Plan: -Lamotrigine  250 mg BID ER.   -reviewed seizure triggers -annual labs -not planning pregnancy IUD in place -gene testing due to reported fmhx of sz, some lack of clarity re type of sz / dx -consideration for medical exemption for continued remote hybrid -follow up in 4-6 months with Dr [place] time 25min    [Medically Refractory (seizure within the last year)] : Medically Refractory (seizure within the last year) [Simple Partial] : simple partial [Complex Partial] : complex partial [Secondary Generalization] : secondary generalization [Idiopathic] : idiopathic  [Focal] : focal [Safety Recommendations] : The patient was advised in regards to the risk of seizures and general seizure safety recommendations including not to be bathing alone, climbing to high places and operating heavy machinery. [Risks Associated with Driving/[place] Law] : As per my usual protocol, the patient was advised in regards to risks and driving privileges associated with the [place] Guidelines.  [Teratogenicity] : Risks associated with AED use in pregnancy, teratogenicity and methods of contraception were discussed.  [Compliance with Medications] : The importance of compliance with medications was reinforced. [Obtain Copies of Medical Records] : Patient was asked to obtain copies of pertinent prior medical records or studies [Sleep Hygiene/Sleep Disruption Risks] : Sleep hygiene and the risks of sleep disruption were discussed.

## 2024-03-15 NOTE — REVIEW OF SYSTEMS
[Memory Lapses or Loss] : memory loss [As Noted in HPI] : as noted in HPI [Decr. Concentrating Ability] : decreased concentrating ability [Seizures] : convulsions [Sleep Disturbances] : sleep disturbances [Migraine Headache] : migraine headaches [Negative] : Heme/Lymph [de-identified] : possible paraphrasias intermittently.

## 2024-05-31 ENCOUNTER — RX RENEWAL (OUTPATIENT)
Age: 39
End: 2024-05-31

## 2024-05-31 RX ORDER — LAMOTRIGINE 250 MG/1
250 TABLET, EXTENDED RELEASE ORAL
Qty: 180 | Refills: 1 | Status: ACTIVE | COMMUNITY
Start: 2020-03-06 | End: 1900-01-01

## 2024-09-27 ENCOUNTER — APPOINTMENT (OUTPATIENT)
Dept: NEUROLOGY | Facility: CLINIC | Age: 39
End: 2024-09-27

## 2025-04-14 ENCOUNTER — RX RENEWAL (OUTPATIENT)
Age: 40
End: 2025-04-14